# Patient Record
Sex: FEMALE | Race: WHITE | NOT HISPANIC OR LATINO | Employment: UNEMPLOYED | URBAN - METROPOLITAN AREA
[De-identification: names, ages, dates, MRNs, and addresses within clinical notes are randomized per-mention and may not be internally consistent; named-entity substitution may affect disease eponyms.]

---

## 2019-02-15 ENCOUNTER — APPOINTMENT (OUTPATIENT)
Dept: RADIOLOGY | Facility: MEDICAL CENTER | Age: 34
End: 2019-02-15
Attending: STUDENT IN AN ORGANIZED HEALTH CARE EDUCATION/TRAINING PROGRAM
Payer: MEDICAID

## 2019-02-20 ENCOUNTER — HOSPITAL ENCOUNTER (OUTPATIENT)
Dept: RADIOLOGY | Facility: MEDICAL CENTER | Age: 34
End: 2019-02-20
Attending: STUDENT IN AN ORGANIZED HEALTH CARE EDUCATION/TRAINING PROGRAM
Payer: MEDICAID

## 2019-02-20 DIAGNOSIS — M25.552 PAIN OF BOTH HIP JOINTS: ICD-10-CM

## 2019-02-20 DIAGNOSIS — M25.551 PAIN OF BOTH HIP JOINTS: ICD-10-CM

## 2019-02-20 PROCEDURE — A9585 GADOBUTROL INJECTION: HCPCS | Performed by: STUDENT IN AN ORGANIZED HEALTH CARE EDUCATION/TRAINING PROGRAM

## 2019-02-20 PROCEDURE — 73722 MRI JOINT OF LWR EXTR W/DYE: CPT | Mod: LT

## 2019-02-20 PROCEDURE — 27093 INJECTION FOR HIP X-RAY: CPT | Mod: LT

## 2019-02-20 PROCEDURE — 700117 HCHG RX CONTRAST REV CODE 255: Performed by: STUDENT IN AN ORGANIZED HEALTH CARE EDUCATION/TRAINING PROGRAM

## 2019-02-20 PROCEDURE — 700111 HCHG RX REV CODE 636 W/ 250 OVERRIDE (IP): Performed by: STUDENT IN AN ORGANIZED HEALTH CARE EDUCATION/TRAINING PROGRAM

## 2019-02-20 RX ORDER — GADOBUTROL 604.72 MG/ML
2 INJECTION INTRAVENOUS ONCE
Status: COMPLETED | OUTPATIENT
Start: 2019-02-20 | End: 2019-02-20

## 2019-02-20 RX ORDER — METHYLPREDNISOLONE ACETATE 80 MG/ML
80 INJECTION, SUSPENSION INTRA-ARTICULAR; INTRALESIONAL; INTRAMUSCULAR; SOFT TISSUE ONCE
Status: COMPLETED | OUTPATIENT
Start: 2019-02-20 | End: 2019-02-20

## 2019-02-20 RX ADMIN — GADOBUTROL 1 ML: 604.72 INJECTION INTRAVENOUS at 13:00

## 2019-02-20 RX ADMIN — METHYLPREDNISOLONE ACETATE 80 MG: 80 INJECTION, SUSPENSION INTRA-ARTICULAR; INTRALESIONAL; INTRAMUSCULAR; SOFT TISSUE at 13:00

## 2019-02-20 RX ADMIN — IOHEXOL 5 ML: 300 INJECTION, SOLUTION INTRAVENOUS at 13:00

## 2019-02-25 ENCOUNTER — HOSPITAL ENCOUNTER (OUTPATIENT)
Dept: RADIOLOGY | Facility: MEDICAL CENTER | Age: 34
End: 2019-02-25
Attending: STUDENT IN AN ORGANIZED HEALTH CARE EDUCATION/TRAINING PROGRAM
Payer: MEDICAID

## 2019-02-25 DIAGNOSIS — M25.552 PAIN OF BOTH HIP JOINTS: ICD-10-CM

## 2019-02-25 DIAGNOSIS — M25.551 PAIN OF BOTH HIP JOINTS: ICD-10-CM

## 2019-02-25 PROCEDURE — 73722 MRI JOINT OF LWR EXTR W/DYE: CPT | Mod: RT

## 2019-02-25 PROCEDURE — A9585 GADOBUTROL INJECTION: HCPCS | Performed by: STUDENT IN AN ORGANIZED HEALTH CARE EDUCATION/TRAINING PROGRAM

## 2019-02-25 PROCEDURE — 700111 HCHG RX REV CODE 636 W/ 250 OVERRIDE (IP): Performed by: STUDENT IN AN ORGANIZED HEALTH CARE EDUCATION/TRAINING PROGRAM

## 2019-02-25 PROCEDURE — 700117 HCHG RX CONTRAST REV CODE 255: Performed by: STUDENT IN AN ORGANIZED HEALTH CARE EDUCATION/TRAINING PROGRAM

## 2019-02-25 PROCEDURE — 27093 INJECTION FOR HIP X-RAY: CPT | Mod: RT

## 2019-02-25 RX ORDER — GADOBUTROL 604.72 MG/ML
2 INJECTION INTRAVENOUS ONCE
Status: COMPLETED | OUTPATIENT
Start: 2019-02-25 | End: 2019-02-25

## 2019-02-25 RX ORDER — METHYLPREDNISOLONE ACETATE 80 MG/ML
80 INJECTION, SUSPENSION INTRA-ARTICULAR; INTRALESIONAL; INTRAMUSCULAR; SOFT TISSUE ONCE
Status: COMPLETED | OUTPATIENT
Start: 2019-02-25 | End: 2019-02-25

## 2019-02-25 RX ADMIN — METHYLPREDNISOLONE ACETATE 80 MG: 80 INJECTION, SUSPENSION INTRA-ARTICULAR; INTRALESIONAL; INTRAMUSCULAR; SOFT TISSUE at 16:05

## 2019-02-25 RX ADMIN — IOHEXOL 5 ML: 300 INJECTION, SOLUTION INTRAVENOUS at 16:05

## 2019-02-25 RX ADMIN — GADOBUTROL 1 ML: 604.72 INJECTION INTRAVENOUS at 16:05

## 2020-03-03 ENCOUNTER — HOSPITAL ENCOUNTER (EMERGENCY)
Facility: MEDICAL CENTER | Age: 35
End: 2020-03-03
Attending: EMERGENCY MEDICINE
Payer: COMMERCIAL

## 2020-03-03 VITALS
RESPIRATION RATE: 18 BRPM | OXYGEN SATURATION: 97 % | SYSTOLIC BLOOD PRESSURE: 122 MMHG | HEART RATE: 80 BPM | DIASTOLIC BLOOD PRESSURE: 80 MMHG | TEMPERATURE: 98.2 F | WEIGHT: 192.02 LBS

## 2020-03-03 DIAGNOSIS — S39.012A STRAIN OF LUMBAR REGION, INITIAL ENCOUNTER: ICD-10-CM
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
## 2020-03-03 DIAGNOSIS — S09.90XA CLOSED HEAD INJURY, INITIAL ENCOUNTER: ICD-10-CM

## 2020-03-03 DIAGNOSIS — S16.1XXA STRAIN OF NECK MUSCLE, INITIAL ENCOUNTER: ICD-10-CM

## 2020-03-03 PROCEDURE — A9270 NON-COVERED ITEM OR SERVICE: HCPCS | Performed by: EMERGENCY MEDICINE

## 2020-03-03 PROCEDURE — 700102 HCHG RX REV CODE 250 W/ 637 OVERRIDE(OP): Performed by: EMERGENCY MEDICINE

## 2020-03-03 PROCEDURE — 99284 EMERGENCY DEPT VISIT MOD MDM: CPT

## 2020-03-03 RX ORDER — IBUPROFEN 600 MG/1
600 TABLET ORAL EVERY 6 HOURS PRN
Qty: 30 TAB | Refills: 0 | Status: SHIPPED | OUTPATIENT
Start: 2020-03-03 | End: 2020-04-02

## 2020-03-03 RX ORDER — CYCLOBENZAPRINE HCL 10 MG
10 TABLET ORAL 3 TIMES DAILY PRN
Qty: 30 TAB | Refills: 0 | Status: SHIPPED | OUTPATIENT
Start: 2020-03-03 | End: 2020-11-03

## 2020-03-03 RX ORDER — IBUPROFEN 200 MG
400 TABLET ORAL ONCE
Status: COMPLETED | OUTPATIENT
Start: 2020-03-03 | End: 2020-03-03

## 2020-03-03 RX ORDER — CYCLOBENZAPRINE HCL 10 MG
10 TABLET ORAL ONCE
Status: COMPLETED | OUTPATIENT
Start: 2020-03-03 | End: 2020-03-03

## 2020-03-03 RX ORDER — ACETAMINOPHEN 325 MG/1
650 TABLET ORAL ONCE
Status: COMPLETED | OUTPATIENT
Start: 2020-03-03 | End: 2020-03-03

## 2020-03-03 RX ORDER — HYDROCODONE BITARTRATE AND ACETAMINOPHEN 7.5; 325 MG/1; MG/1
1-2 TABLET ORAL EVERY 6 HOURS PRN
Status: SHIPPED | COMMUNITY
End: 2022-10-12

## 2020-03-03 RX ADMIN — IBUPROFEN 400 MG: 200 TABLET, FILM COATED ORAL at 17:12

## 2020-03-03 RX ADMIN — ACETAMINOPHEN 650 MG: 325 TABLET, FILM COATED ORAL at 17:12

## 2020-03-03 RX ADMIN — CYCLOBENZAPRINE 10 MG: 10 TABLET, FILM COATED ORAL at 17:12

## 2020-03-03 ASSESSMENT — ENCOUNTER SYMPTOMS
MYALGIAS: 1
BACK PAIN: 1
FEVER: 0
DOUBLE VISION: 0
BLURRED VISION: 0
PHOTOPHOBIA: 0
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
## 2020-07-23 ENCOUNTER — HOSPITAL ENCOUNTER (OUTPATIENT)
Dept: RADIOLOGY | Facility: MEDICAL CENTER | Age: 35
End: 2020-07-23
Attending: PHYSICIAN ASSISTANT
Payer: MEDICAID

## 2020-07-23 DIAGNOSIS — M25.552 LEFT HIP PAIN: ICD-10-CM

## 2020-07-23 DIAGNOSIS — M24.152 OTHER ARTICULAR CARTILAGE DISORDERS, LEFT HIP: ICD-10-CM

## 2020-07-23 PROCEDURE — 73721 MRI JNT OF LWR EXTRE W/O DYE: CPT | Mod: LT

## 2020-11-03 ENCOUNTER — HOSPITAL ENCOUNTER (EMERGENCY)
Facility: MEDICAL CENTER | Age: 35
End: 2020-11-03
Attending: EMERGENCY MEDICINE
Payer: MEDICAID

## 2020-11-03 ENCOUNTER — APPOINTMENT (OUTPATIENT)
Dept: RADIOLOGY | Facility: MEDICAL CENTER | Age: 35
End: 2020-11-03
Attending: EMERGENCY MEDICINE
Payer: MEDICAID

## 2020-11-03 VITALS
RESPIRATION RATE: 16 BRPM | DIASTOLIC BLOOD PRESSURE: 60 MMHG | OXYGEN SATURATION: 100 % | BODY MASS INDEX: 26.13 KG/M2 | TEMPERATURE: 98.6 F | HEIGHT: 70 IN | HEART RATE: 78 BPM | WEIGHT: 182.54 LBS | SYSTOLIC BLOOD PRESSURE: 120 MMHG

## 2020-11-03 DIAGNOSIS — S73.101A HIP SPRAIN, RIGHT, INITIAL ENCOUNTER: ICD-10-CM

## 2020-11-03 LAB
HCG UR QL: NEGATIVE
HCG UR QL: NEGATIVE

## 2020-11-03 PROCEDURE — A9270 NON-COVERED ITEM OR SERVICE: HCPCS | Performed by: EMERGENCY MEDICINE

## 2020-11-03 PROCEDURE — 700102 HCHG RX REV CODE 250 W/ 637 OVERRIDE(OP): Performed by: EMERGENCY MEDICINE

## 2020-11-03 PROCEDURE — 700111 HCHG RX REV CODE 636 W/ 250 OVERRIDE (IP): Performed by: EMERGENCY MEDICINE

## 2020-11-03 PROCEDURE — 99284 EMERGENCY DEPT VISIT MOD MDM: CPT

## 2020-11-03 PROCEDURE — 73501 X-RAY EXAM HIP UNI 1 VIEW: CPT | Mod: RT

## 2020-11-03 PROCEDURE — 96372 THER/PROPH/DIAG INJ SC/IM: CPT

## 2020-11-03 PROCEDURE — 72100 X-RAY EXAM L-S SPINE 2/3 VWS: CPT

## 2020-11-03 PROCEDURE — 81025 URINE PREGNANCY TEST: CPT | Mod: 91

## 2020-11-03 RX ORDER — HYDROCODONE BITARTRATE AND ACETAMINOPHEN 5; 325 MG/1; MG/1
1 TABLET ORAL ONCE
Status: COMPLETED | OUTPATIENT
Start: 2020-11-03 | End: 2020-11-03

## 2020-11-03 RX ORDER — METHOCARBAMOL 750 MG/1
750 TABLET, FILM COATED ORAL 4 TIMES DAILY
Qty: 12 TAB | Refills: 0 | Status: SHIPPED | OUTPATIENT
Start: 2020-11-03 | End: 2020-11-06

## 2020-11-03 RX ORDER — NAPROXEN 500 MG/1
500 TABLET ORAL 2 TIMES DAILY WITH MEALS
Status: SHIPPED | COMMUNITY
End: 2022-10-12

## 2020-11-03 RX ORDER — METHOCARBAMOL 500 MG/1
500 TABLET, FILM COATED ORAL ONCE
Status: COMPLETED | OUTPATIENT
Start: 2020-11-03 | End: 2020-11-03

## 2020-11-03 RX ORDER — KETOROLAC TROMETHAMINE 30 MG/ML
15 INJECTION, SOLUTION INTRAMUSCULAR; INTRAVENOUS ONCE
Status: COMPLETED | OUTPATIENT
Start: 2020-11-03 | End: 2020-11-03

## 2020-11-03 RX ADMIN — METHOCARBAMOL TABLETS 500 MG: 500 TABLET, COATED ORAL at 13:38

## 2020-11-03 RX ADMIN — HYDROCODONE BITARTRATE AND ACETAMINOPHEN 1 TABLET: 5; 325 TABLET ORAL at 15:24

## 2020-11-03 RX ADMIN — KETOROLAC TROMETHAMINE 15 MG: 30 INJECTION, SOLUTION INTRAMUSCULAR; INTRAVENOUS at 13:52

## 2020-11-03 ASSESSMENT — LIFESTYLE VARIABLES
TOTAL SCORE: 0
EVER HAD A DRINK FIRST THING IN THE MORNING TO STEADY YOUR NERVES TO GET RID OF A HANGOVER: NO
EVER FELT BAD OR GUILTY ABOUT YOUR DRINKING: NO
TOTAL SCORE: 0
CONSUMPTION TOTAL: NEGATIVE
AVERAGE NUMBER OF DAYS PER WEEK YOU HAVE A DRINK CONTAINING ALCOHOL: 0
DOES PATIENT WANT TO STOP DRINKING: NO
TOTAL SCORE: 0
HAVE PEOPLE ANNOYED YOU BY CRITICIZING YOUR DRINKING: NO
HOW MANY TIMES IN THE PAST YEAR HAVE YOU HAD 5 OR MORE DRINKS IN A DAY: 0
HAVE YOU EVER FELT YOU SHOULD CUT DOWN ON YOUR DRINKING: NO
ON A TYPICAL DAY WHEN YOU DRINK ALCOHOL HOW MANY DRINKS DO YOU HAVE: 0
DO YOU DRINK ALCOHOL: NO

## 2020-11-03 NOTE — ED TRIAGE NOTES
Pt comes in complaining hip pain. Pt stating she was seen by a chiropractor yesterday and now having severe R hip pain. Pt stating hx of chronic pain to her hips however this is different. Pt is ambulatory

## 2020-11-03 NOTE — DISCHARGE INSTRUCTIONS
Use ice to help with the pain.    Continue to take over-the-counter Advil or Aleve as needed for pain.  Also continue to take your prescribed Norco for pain.    Return to the ER for any worsening pain, changing pain, for radiating pain down your leg, for numbness/tingling/weakness of extremities, loss of bowel or bladder control, fevers over 100.4, shaking chills, abdominal pain, blood in urine, or for any concerns.    Follow-up with your orthopedist within the next 1 to 2 days.  Please call for appointment.

## 2020-11-03 NOTE — ED PROVIDER NOTES
"ED Provider Note    Scribed for Gerda Guzman M.D. by Aleksandra Amado. 11/3/2020  12:50 PM    Primary care provider: Pcp Pt States None  Means of arrival: Walk-in  History obtained from: Patient  History limited by: None noted    CHIEF COMPLAINT  Right hip pain    LIZBETH Mallory is a 34 y.o. female with a history of bilateral hip impingement syndrome who presents with constant moderate right posterior hip pain onset yesterday. Patient states she went to the chiropractor yesterday for left hip pain, and was seen by a different chiropactor then she normally does. She reports the chiropractor adjusted her right hip, which had no pain at the time. However, when adjusting her right hip, she states the practitioner, \"pretzeled\" her right leg over her left, and that during this maneuver she heard a pop in her right posterior hip followed by immediate pain. She adds that her pain is much worse than her chronic hip pain although it is in the same location as her previous right hip pain. Per patient, her pain is exacerbated with any movement at all. Patient denies any pain radiation down her legs. She describes her pain as \"pinching and very localized.\" She denies any, weakness,  numbness or tingling in her legs. The patient is able to ambulate. She reports taking Norco, Ibuprofen, and icing and heating her hip with no alleviation of her symptoms. She notes her last dose of 2 Aleve was at 11:00 AM. She adds that her Norco will usually alleviate her chronic hip pain. Patient has no history of hip surgery. She often takes antiinflammatories to help with her symptoms. She is followed by Dr. Perales for her bilateral hip impingement. No fevers or chills. No abdominal pain. No hematuria.     REVIEW OF SYSTEMS  Positives include right hip pain. Negatives include no fever, chills, hematuria, abdominal pain,weakness, numbness or tingling down bilateral legs.      PAST MEDICAL HISTORY   has a past medical history of Herpes and " "Pain.    SURGICAL HISTORY  patient denies any surgical history    SOCIAL HISTORY  Social History     Tobacco Use   • Smoking status: Current Every Day Smoker     Packs/day: 0.25     Types: Cigarettes   • Smokeless tobacco: Never Used   Substance Use Topics   • Alcohol use: Yes   • Drug use: Yes     Types: Inhaled     Comment: marijuana      Social History     Substance and Sexual Activity   Drug Use Yes   • Types: Inhaled    Comment: marijuana       FAMILY HISTORY  History reviewed. No pertinent family history.    CURRENT MEDICATIONS  Home Medications     Reviewed by Grecia Kim R.N. (Registered Nurse) on 11/03/20 at 1157  Med List Status: Complete   Medication Last Dose Status   HYDROcodone-acetaminophen (NORCO) 7.5-325 MG per tablet  Active   MICROGESTIN FE 1/20 PO  Active   naproxen (NAPROSYN) 500 MG Tab  Active                ALLERGIES  Allergies   Allergen Reactions   • Codeine    • Codeine        PHYSICAL EXAM  VITAL SIGNS: /85   Pulse 94   Temp 36.7 °C (98.1 °F) (Temporal)   Resp 12   Ht 1.778 m (5' 10\")   Wt 82.8 kg (182 lb 8.7 oz)   SpO2 98%   BMI 26.19 kg/m²   Constitutional:  Alert in no apparent distress.  HENT: Normocephalic, Bilateral external ears normal. Nose normal. Oropharyngeal exam deferred to COVID-19 outbreak and lack of oropharyngeal complaints.  Eyes: Conjunctiva normal, non-icteric.   Thorax & Lungs: Easy unlabored respirations  Skin: Visualized skin warm and dry, No erythema, No rash.   Extremities:   Full range of motion, No asymmetry  Neurologic: Alert, clear speech, lower extremity strengths are 5-5 and equal bilaterally with testing of dorsiflexors, plantar flexors, quadriceps and hamstrings.  Patient able to elevate bilateral legs up off the bed not any difficulty.  Negative straight leg your legs bilaterally.  Patient has been observed ambulating to the bathroom without any significant difficulty.  Musculoskeletal: There is some tenderness with deep palpation of the " right lower lumbar region in the area of the upper sacrum..  No SI notch tenderness or buttock tenderness.  There is some reproducible pain in the upper right buttock area with range of motion of the right hip.  No shortening or rotation.  2+ DP and PT pulses equal bilaterally.  Psychiatric: Affect and Mood normal    LABS  Results for orders placed or performed during the hospital encounter of 11/03/20   BETA-HCG QUALITATIVE URINE   Result Value Ref Range    Beta-Hcg Urine Negative Negative   POC URINE PREGNANCY   Result Value Ref Range    POC Urine Pregnancy Test Negative Negative     All labs reviewed by me.    DX-LUMBAR SPINE-2 OR 3 VIEWS   Final Result      L5-S1 mild disc degeneration.      DX-HIP-UNILATERAL-WITH PELVIS-1 VIEW RIGHT   Final Result      Mild degenerative changes without acute osseous abnormality.          COURSE & MEDICAL DECISION MAKING  Nursing notes and vital signs were reviewed. (See chart for details)    12:50 PM - Patient seen and examined at bedside. Patient is laying down in bed with face mask on. She currently has right hip pain and is requesting Toradol. Patient will be treated with Robaxin 500 mg and Toradol 15 mg. Ordered labs and imaging to evaluate her symptoms.     3:07 PM - Patient will be treated with Norco 5-325 mg.    4:00 PM - Patient was reevaluated at bedside. She reports feeling improved following medications. Discussed lab and radiology results with the patient as detailed above. Patient is informed that they are now stable for discharge. She was urged to follow up with Gallo Velasquez regarding today's visit. Patient is advised of all return precautions. Patient verbalizes an understanding and agreement to this plan of care.     PPE Note: I personally donned full PPE for all patient encounters during this visit, including an N95 respirator mask, gloves, gown, and goggles.     Scribe remained outside the patient's room and did not have any contact with the patient  for the duration of patient encounter.     Decision Making:  The patient presents to the Emergency Department with to the ER with complaint of sudden onset of right hip pain after the chiropractor she was seen yesterday for her left hip problem, crossed her right leg over her left leg in a rotated and abducted manner, causing a pop in the back part of her right hip/sacral area.  She states since then she has had quite a lot of discomfort.  Patient has history of bilateral hip impingement syndrome.  She says she has had pain in this part of her right hip before, but this pain is much more severe and feels a little bit different.  However the location is similar.  No numbness or tingling into the leg or foot.  No weakness of the leg.  No loss of bowel or bladder control.  Patient is able to ambulate.  She says she was completely fine until the chiropractor took her leg and rotated and abducted over her left leg.  Patient takes Norco daily for her chronic hip pain.  She took some Aleve prior to arrival.  I gave her a small dose of IM Toradol here as she thought that might help.  I also gave her some Robaxin.  Robaxin and Toradol did not help so then I gave her Norco.  At this time, I think a pop the patient might of felt without particular movement of her leg, may be in a ligamentous tear.  Possible she could have a muscle tear as well.  X-rays are negative.  No obvious bony abnormality.  She is able to ambulate.  Not think we need to do CT scan or MRI scan of her hip at this time.  She has known hip problems and follows with Dr. Perales, orthopedics.  She has been instructed to follow-up with him within the next several days.  She is to call for appointment.  She has been given strict return precautions and discharge instructions for musculoskeletal strain/sprain and possible ligamentous tear, and understands treatment plan and follow-up.    Discharge Medications: Robaxin        DISPOSITION:  Patient will be  discharged home in stable condition.    FOLLOW UP:  Kalyan Perales M.D.  [address] [place] [address]  [phone]    Schedule an appointment as soon as possible for a visit   If symptoms worsen return to ER      OUTPATIENT MEDICATIONS:  Discharge Medication List as of 11/3/2020  3:58 PM      START taking these medications    Details   methocarbamol (ROBAXIN) 750 MG Tab Take 1 Tab by mouth 4 times a day for 3 days., Disp-12 Tab, R-0, Print Rx Paper                FINAL IMPRESSION  1. Hip sprain, right, initial encounter           This dictation has been created using voice recognition software. The accuracy of the dictation is limited by the abilities of the software. I expect there may be some errors of grammar and possibly content. I made every attempt to manually correct the errors within my dictation. However, errors related to voice recognition software may still exist and should be interpreted within the appropriate context.     Aleksandra ZAFAR (Scribe), am scribing for, and in the presence of, Gerda Guzman M.D..    Electronically signed by: Aleksandra Amado (Scribe), 11/3/2020    Gerda ZAFAR M.D. personally performed the services described in this documentation, as scribed by Aleksandra Amado in my presence, and it is both accurate and complete. E.    The note accurately reflects work and decisions made by me.  Gerda Guzman M.D.  11/3/2020  3:43 PM

## 2021-02-18 ENCOUNTER — HOSPITAL ENCOUNTER (OUTPATIENT)
Facility: MEDICAL CENTER | Age: 36
End: 2021-02-18
Attending: OBSTETRICS & GYNECOLOGY | Admitting: OBSTETRICS & GYNECOLOGY
Payer: MEDICAID

## 2021-06-16 ENCOUNTER — HOSPITAL ENCOUNTER (EMERGENCY)
Facility: MEDICAL CENTER | Age: 36
End: 2021-06-16
Attending: EMERGENCY MEDICINE
Payer: MEDICAID

## 2021-06-16 ENCOUNTER — APPOINTMENT (OUTPATIENT)
Dept: RADIOLOGY | Facility: MEDICAL CENTER | Age: 36
End: 2021-06-16
Attending: EMERGENCY MEDICINE
Payer: MEDICAID

## 2021-06-16 VITALS
OXYGEN SATURATION: 97 % | BODY MASS INDEX: 25.98 KG/M2 | SYSTOLIC BLOOD PRESSURE: 104 MMHG | HEIGHT: 70 IN | WEIGHT: 181.44 LBS | DIASTOLIC BLOOD PRESSURE: 60 MMHG | RESPIRATION RATE: 18 BRPM | HEART RATE: 72 BPM | TEMPERATURE: 96.8 F

## 2021-06-16 DIAGNOSIS — S50.02XA CONTUSION OF LEFT ELBOW, INITIAL ENCOUNTER: ICD-10-CM
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
## 2021-06-16 PROCEDURE — 99284 EMERGENCY DEPT VISIT MOD MDM: CPT

## 2021-06-16 PROCEDURE — A9270 NON-COVERED ITEM OR SERVICE: HCPCS | Performed by: EMERGENCY MEDICINE

## 2021-06-16 PROCEDURE — 700102 HCHG RX REV CODE 250 W/ 637 OVERRIDE(OP): Performed by: EMERGENCY MEDICINE

## 2021-06-16 PROCEDURE — 73080 X-RAY EXAM OF ELBOW: CPT | Mod: RT

## 2021-06-16 RX ORDER — OXYCODONE HYDROCHLORIDE 5 MG/1
10 TABLET ORAL ONCE
Status: COMPLETED | OUTPATIENT
Start: 2021-06-16 | End: 2021-06-16

## 2021-06-16 RX ADMIN — OXYCODONE 10 MG: 5 TABLET ORAL at 10:38

## 2021-06-16 NOTE — ED TRIAGE NOTES
"Chief Complaint   Patient presents with   • T-5000 FALL     while hanging something she fell off of a ladder, onto the ladder. approx 1 hour ago. pain to bilateral arms near elbows, RT hurts more than LT.    • Arm Injury     Pt to triage for above. -LOC. Did not hit head. NAD noted. Bruising and swelling observed to bilateral arms.     /70   Pulse 98   Temp 36 °C (96.8 °F) (Temporal)   Resp 16   Ht 1.778 m (5' 10\")   Wt 82.3 kg (181 lb 7 oz)   SpO2 97%   BMI 26.03 kg/m²     "

## 2021-06-16 NOTE — ED PROVIDER NOTES
"ED Provider Note    CHIEF COMPLAINT  Chief Complaint   Patient presents with   • T-5000 FALL     while hanging something she fell off of a ladder, onto the ladder. approx 1 hour ago. pain to bilateral arms near elbows, RT hurts more than LT.    • Arm Injury     Seen at 10:30 AM    HPI  Magui Mallory is a 35 y.o. female who presents with moderate constant, throbbing right elbow pain, worse with movement.  The patient was standing on a stepladder when the ladder fell over, she fell backwards, striking the bilateral elbows on the frame of the ladder before striking the ground.  She did not strike her head.  She denies any headache, loss of conscious, neck pain, back pain, numbness or focal weakness.  She has been fully ambulatory since the fall.  She notes bilateral elbow pain, the left is mild and she has good range of motion.  The right is much more significant and she is having difficulty moving the forearm and hands as it reproduces pain in the elbow.  She does take chronic hydrocodone for left hip pain.  She took a dose prior to arrival.  She is not on any anticoagulation.    REVIEW OF SYSTEMS  See HPI  PAST MEDICAL HISTORY   has a past medical history of Herpes and Pain.    SOCIAL HISTORY  Social History     Tobacco Use   • Smoking status: Current Every Day Smoker     Packs/day: 0.25     Types: Cigarettes   • Smokeless tobacco: Never Used   Vaping Use   • Vaping Use: Never used   Substance and Sexual Activity   • Alcohol use: Yes   • Drug use: Yes     Types: Inhaled     Comment: marijuana   • Sexual activity: Not on file       SURGICAL HISTORY  patient denies any surgical history    CURRENT MEDICATIONS  Reviewed.  See Encounter Summary.     ALLERGIES  Allergies   Allergen Reactions   • Codeine    • Codeine        PHYSICAL EXAM  VITAL SIGNS: /60   Pulse 72   Temp 36 °C (96.8 °F) (Temporal)   Resp 18   Ht 1.778 m (5' 10\")   Wt 82.3 kg (181 lb 7 oz)   SpO2 97%   BMI 26.03 kg/m²   Constitutional: " Awake, alert in no apparent distress.  HENT: Normocephalic, atraumatic.  No midline cervical tenderness, full range of motion.  Bilateral external ears normal. Nose normal.    Eyes: Conjunctiva normal, non-icteric, EOMI.    Thorax & Lungs: Easy unlabored respirations  Cardiovascular: Brisk capillary refill, radial pulse 2+.  Abdomen:  No distention  Skin: Visualized skin is  Dry, No erythema, No rash.   Extremities: Clavicles, forearms, hands atraumatic.  The patient has abrasions and some mild ecchymosis over the posterior left elbow but the elbow itself has excellent range of motion.  The right elbow has limited range of motion, mild posterior effusion.  No crepitus.  No midline tenderness throughout the back or step-off.  Neurologic: Alert, Grossly non-focal.   Psychiatric: Affect and Mood normal    RADIOLOGY  DX-ELBOW-COMPLETE 3+ RIGHT   Final Result         No acute osseous abnormality.          Nursing notes and vital signs were reviewed. (See chart for details)    Decision Making:  This is a pleasant 35 y.o. year old female who presents with significant right elbow pain after direct trauma.  The head did not have any trauma, the neck can be cleared using Nexus criteria.  X-rays of the elbow are negative for fracture.  Tetanus is up-to-date.  The patient will be discharged.  I recommend rice therapy in addition to her regular pain medications.    DISPOSITION:  Patient will be discharged home in good condition.    Discharge Medications:  Discharge Medication List as of 6/16/2021 11:25 AM          The patient was discharged home (see d/c instructions) and told to return immediately for any signs or symptoms listed, or any worsening at all.  The patient verbally agreed to the discharge precautions and follow-up plan which is documented in EPIC.        FINAL IMPRESSION  1. Contusion of left elbow, initial encounter    2. Fall, initial encounter

## 2021-06-16 NOTE — ED NOTES
Sling placed right arm. Pt states understanding of how to position and place sling. States understanding of discharge instructions.

## 2021-06-16 NOTE — ED NOTES
Pt states she was standing on 5 foot step ladder when she fell backwards and struck bottom and both elbows on ground. Denies neck or back pain. Denies loss of consc. A/Ox4. No obvious deformity noted. No active bleeding or open wounds noted. Left arm with area of abrasion/ bruising. States normal feeling to fingers/ hands.  Fingers/ hands skin pink, warm, dry.

## 2021-07-22 PROBLEM — S46.001A ROTATOR CUFF INJURY, RIGHT, INITIAL ENCOUNTER: Status: ACTIVE | Noted: 2021-07-22

## 2021-08-30 ENCOUNTER — TELEPHONE (OUTPATIENT)
Dept: SURGERY | Facility: MEDICAL CENTER | Age: 36
End: 2021-08-30

## 2021-09-22 ENCOUNTER — HOSPITAL ENCOUNTER (OUTPATIENT)
Facility: MEDICAL CENTER | Age: 36
End: 2021-09-22
Attending: OBSTETRICS & GYNECOLOGY | Admitting: OBSTETRICS & GYNECOLOGY
Payer: MEDICAID

## 2021-09-30 ENCOUNTER — PRE-ADMISSION TESTING (OUTPATIENT)
Dept: ADMISSIONS | Facility: MEDICAL CENTER | Age: 36
End: 2021-09-30
Attending: OBSTETRICS & GYNECOLOGY
Payer: MEDICAID

## 2021-09-30 VITALS — WEIGHT: 177.47 LBS | BODY MASS INDEX: 25.41 KG/M2 | HEIGHT: 70 IN

## 2021-09-30 DIAGNOSIS — Z01.812 PRE-OPERATIVE LABORATORY EXAMINATION: ICD-10-CM

## 2021-09-30 LAB
ANION GAP SERPL CALC-SCNC: 10 MMOL/L (ref 7–16)
APPEARANCE UR: CLEAR
BACTERIA #/AREA URNS HPF: NEGATIVE /HPF
BASOPHILS # BLD AUTO: 0.3 % (ref 0–1.8)
BASOPHILS # BLD: 0.02 K/UL (ref 0–0.12)
BILIRUB UR QL STRIP.AUTO: NEGATIVE
BUN SERPL-MCNC: 13 MG/DL (ref 8–22)
CALCIUM SERPL-MCNC: 9.2 MG/DL (ref 8.5–10.5)
CHLORIDE SERPL-SCNC: 103 MMOL/L (ref 96–112)
CO2 SERPL-SCNC: 26 MMOL/L (ref 20–33)
COLOR UR: YELLOW
CREAT SERPL-MCNC: 0.68 MG/DL (ref 0.5–1.4)
EOSINOPHIL # BLD AUTO: 0.08 K/UL (ref 0–0.51)
EOSINOPHIL NFR BLD: 1.3 % (ref 0–6.9)
EPI CELLS #/AREA URNS HPF: NEGATIVE /HPF
ERYTHROCYTE [DISTWIDTH] IN BLOOD BY AUTOMATED COUNT: 45.5 FL (ref 35.9–50)
GLUCOSE SERPL-MCNC: 90 MG/DL (ref 65–99)
GLUCOSE UR STRIP.AUTO-MCNC: NEGATIVE MG/DL
HCG SERPL QL: NEGATIVE
HCT VFR BLD AUTO: 41.1 % (ref 37–47)
HGB BLD-MCNC: 13.7 G/DL (ref 12–16)
HYALINE CASTS #/AREA URNS LPF: ABNORMAL /LPF
IMM GRANULOCYTES # BLD AUTO: 0.02 K/UL (ref 0–0.11)
IMM GRANULOCYTES NFR BLD AUTO: 0.3 % (ref 0–0.9)
KETONES UR STRIP.AUTO-MCNC: NEGATIVE MG/DL
LEUKOCYTE ESTERASE UR QL STRIP.AUTO: NEGATIVE
LYMPHOCYTES # BLD AUTO: 1.54 K/UL (ref 1–4.8)
LYMPHOCYTES NFR BLD: 25.6 % (ref 22–41)
MCH RBC QN AUTO: 31.6 PG (ref 27–33)
MCHC RBC AUTO-ENTMCNC: 33.3 G/DL (ref 33.6–35)
MCV RBC AUTO: 94.7 FL (ref 81.4–97.8)
MICRO URNS: ABNORMAL
MONOCYTES # BLD AUTO: 0.42 K/UL (ref 0–0.85)
MONOCYTES NFR BLD AUTO: 7 % (ref 0–13.4)
NEUTROPHILS # BLD AUTO: 3.93 K/UL (ref 2–7.15)
NEUTROPHILS NFR BLD: 65.5 % (ref 44–72)
NITRITE UR QL STRIP.AUTO: NEGATIVE
NRBC # BLD AUTO: 0 K/UL
NRBC BLD-RTO: 0 /100 WBC
PH UR STRIP.AUTO: 7 [PH] (ref 5–8)
PLATELET # BLD AUTO: 201 K/UL (ref 164–446)
PMV BLD AUTO: 10.6 FL (ref 9–12.9)
POTASSIUM SERPL-SCNC: 4.3 MMOL/L (ref 3.6–5.5)
PROT UR QL STRIP: NEGATIVE MG/DL
RBC # BLD AUTO: 4.34 M/UL (ref 4.2–5.4)
RBC # URNS HPF: ABNORMAL /HPF
RBC UR QL AUTO: ABNORMAL
SODIUM SERPL-SCNC: 139 MMOL/L (ref 135–145)
SP GR UR STRIP.AUTO: 1.01
UROBILINOGEN UR STRIP.AUTO-MCNC: 0.2 MG/DL
WBC # BLD AUTO: 6 K/UL (ref 4.8–10.8)
WBC #/AREA URNS HPF: ABNORMAL /HPF

## 2021-09-30 PROCEDURE — 85025 COMPLETE CBC W/AUTO DIFF WBC: CPT

## 2021-09-30 PROCEDURE — 36415 COLL VENOUS BLD VENIPUNCTURE: CPT

## 2021-09-30 PROCEDURE — 84703 CHORIONIC GONADOTROPIN ASSAY: CPT

## 2021-09-30 PROCEDURE — 80048 BASIC METABOLIC PNL TOTAL CA: CPT

## 2021-09-30 PROCEDURE — 81001 URINALYSIS AUTO W/SCOPE: CPT

## 2021-09-30 RX ORDER — ESOMEPRAZOLE MAGNESIUM 10 MG/1
GRANULE, FOR SUSPENSION, EXTENDED RELEASE ORAL
COMMUNITY
End: 2023-11-28

## 2021-10-07 ENCOUNTER — HOSPITAL ENCOUNTER (EMERGENCY)
Facility: MEDICAL CENTER | Age: 36
End: 2021-10-07
Attending: EMERGENCY MEDICINE
Payer: MEDICAID

## 2021-10-07 VITALS
DIASTOLIC BLOOD PRESSURE: 72 MMHG | OXYGEN SATURATION: 98 % | BODY MASS INDEX: 31.05 KG/M2 | HEIGHT: 63 IN | TEMPERATURE: 97.6 F | SYSTOLIC BLOOD PRESSURE: 129 MMHG | RESPIRATION RATE: 18 BRPM | HEART RATE: 62 BPM | WEIGHT: 175.27 LBS

## 2021-10-07 DIAGNOSIS — R11.2 CANNABINOID HYPEREMESIS SYNDROME: ICD-10-CM

## 2021-10-07 DIAGNOSIS — F12.90 CANNABINOID HYPEREMESIS SYNDROME: ICD-10-CM

## 2021-10-07 LAB
ALBUMIN SERPL BCP-MCNC: 4.6 G/DL (ref 3.2–4.9)
ALBUMIN/GLOB SERPL: 2 G/DL
ALP SERPL-CCNC: 58 U/L (ref 30–99)
ALT SERPL-CCNC: 12 U/L (ref 2–50)
ANION GAP SERPL CALC-SCNC: 12 MMOL/L (ref 7–16)
AST SERPL-CCNC: 20 U/L (ref 12–45)
BASOPHILS # BLD AUTO: 0.2 % (ref 0–1.8)
BASOPHILS # BLD: 0.02 K/UL (ref 0–0.12)
BILIRUB SERPL-MCNC: 0.5 MG/DL (ref 0.1–1.5)
BUN SERPL-MCNC: 12 MG/DL (ref 8–22)
CALCIUM SERPL-MCNC: 9.3 MG/DL (ref 8.5–10.5)
CHLORIDE SERPL-SCNC: 106 MMOL/L (ref 96–112)
CO2 SERPL-SCNC: 20 MMOL/L (ref 20–33)
CREAT SERPL-MCNC: 0.71 MG/DL (ref 0.5–1.4)
EOSINOPHIL # BLD AUTO: 0 K/UL (ref 0–0.51)
EOSINOPHIL NFR BLD: 0 % (ref 0–6.9)
ERYTHROCYTE [DISTWIDTH] IN BLOOD BY AUTOMATED COUNT: 42.3 FL (ref 35.9–50)
GLOBULIN SER CALC-MCNC: 2.3 G/DL (ref 1.9–3.5)
GLUCOSE SERPL-MCNC: 171 MG/DL (ref 65–99)
HCT VFR BLD AUTO: 41.3 % (ref 37–47)
HGB BLD-MCNC: 14.6 G/DL (ref 12–16)
IMM GRANULOCYTES # BLD AUTO: 0.04 K/UL (ref 0–0.11)
IMM GRANULOCYTES NFR BLD AUTO: 0.5 % (ref 0–0.9)
LIPASE SERPL-CCNC: 15 U/L (ref 11–82)
LYMPHOCYTES # BLD AUTO: 1.04 K/UL (ref 1–4.8)
LYMPHOCYTES NFR BLD: 12.4 % (ref 22–41)
MCH RBC QN AUTO: 31.9 PG (ref 27–33)
MCHC RBC AUTO-ENTMCNC: 35.4 G/DL (ref 33.6–35)
MCV RBC AUTO: 90.4 FL (ref 81.4–97.8)
MONOCYTES # BLD AUTO: 0.27 K/UL (ref 0–0.85)
MONOCYTES NFR BLD AUTO: 3.2 % (ref 0–13.4)
NEUTROPHILS # BLD AUTO: 7.01 K/UL (ref 2–7.15)
NEUTROPHILS NFR BLD: 83.7 % (ref 44–72)
NRBC # BLD AUTO: 0 K/UL
NRBC BLD-RTO: 0 /100 WBC
PLATELET # BLD AUTO: 222 K/UL (ref 164–446)
PMV BLD AUTO: 10 FL (ref 9–12.9)
POTASSIUM SERPL-SCNC: 3.7 MMOL/L (ref 3.6–5.5)
PROT SERPL-MCNC: 6.9 G/DL (ref 6–8.2)
RBC # BLD AUTO: 4.57 M/UL (ref 4.2–5.4)
SODIUM SERPL-SCNC: 138 MMOL/L (ref 135–145)
WBC # BLD AUTO: 8.4 K/UL (ref 4.8–10.8)

## 2021-10-07 PROCEDURE — 99284 EMERGENCY DEPT VISIT MOD MDM: CPT

## 2021-10-07 PROCEDURE — 96375 TX/PRO/DX INJ NEW DRUG ADDON: CPT

## 2021-10-07 PROCEDURE — 700105 HCHG RX REV CODE 258: Performed by: EMERGENCY MEDICINE

## 2021-10-07 PROCEDURE — 80053 COMPREHEN METABOLIC PANEL: CPT

## 2021-10-07 PROCEDURE — 700111 HCHG RX REV CODE 636 W/ 250 OVERRIDE (IP): Performed by: EMERGENCY MEDICINE

## 2021-10-07 PROCEDURE — 96374 THER/PROPH/DIAG INJ IV PUSH: CPT

## 2021-10-07 PROCEDURE — 85025 COMPLETE CBC W/AUTO DIFF WBC: CPT

## 2021-10-07 PROCEDURE — 83690 ASSAY OF LIPASE: CPT

## 2021-10-07 RX ORDER — METOCLOPRAMIDE HYDROCHLORIDE 5 MG/ML
10 INJECTION INTRAMUSCULAR; INTRAVENOUS ONCE
Status: COMPLETED | OUTPATIENT
Start: 2021-10-07 | End: 2021-10-07

## 2021-10-07 RX ORDER — PROMETHAZINE HYDROCHLORIDE 25 MG/1
25 SUPPOSITORY RECTAL EVERY 6 HOURS PRN
Qty: 12 SUPPOSITORY | Refills: 0 | Status: ON HOLD | OUTPATIENT
Start: 2021-10-07 | End: 2023-03-03

## 2021-10-07 RX ORDER — HALOPERIDOL 5 MG/ML
2 INJECTION INTRAMUSCULAR ONCE
Status: COMPLETED | OUTPATIENT
Start: 2021-10-07 | End: 2021-10-07

## 2021-10-07 RX ORDER — LORAZEPAM 2 MG/ML
1 INJECTION INTRAMUSCULAR ONCE
Status: COMPLETED | OUTPATIENT
Start: 2021-10-07 | End: 2021-10-07

## 2021-10-07 RX ORDER — SODIUM CHLORIDE 9 MG/ML
1000 INJECTION, SOLUTION INTRAVENOUS ONCE
Status: COMPLETED | OUTPATIENT
Start: 2021-10-07 | End: 2021-10-07

## 2021-10-07 RX ORDER — DIPHENHYDRAMINE HYDROCHLORIDE 50 MG/ML
25 INJECTION INTRAMUSCULAR; INTRAVENOUS ONCE
Status: COMPLETED | OUTPATIENT
Start: 2021-10-07 | End: 2021-10-07

## 2021-10-07 RX ORDER — MORPHINE SULFATE 4 MG/ML
4 INJECTION, SOLUTION INTRAMUSCULAR; INTRAVENOUS ONCE
Status: COMPLETED | OUTPATIENT
Start: 2021-10-07 | End: 2021-10-07

## 2021-10-07 RX ADMIN — LORAZEPAM 1 MG: 2 INJECTION INTRAMUSCULAR; INTRAVENOUS at 07:55

## 2021-10-07 RX ADMIN — HALOPERIDOL LACTATE 2 MG: 5 INJECTION, SOLUTION INTRAMUSCULAR at 07:54

## 2021-10-07 RX ADMIN — METOCLOPRAMIDE 10 MG: 5 INJECTION, SOLUTION INTRAMUSCULAR; INTRAVENOUS at 07:54

## 2021-10-07 RX ADMIN — MORPHINE SULFATE 4 MG: 4 INJECTION INTRAVENOUS at 09:08

## 2021-10-07 RX ADMIN — SODIUM CHLORIDE 1000 ML: 9 INJECTION, SOLUTION INTRAVENOUS at 07:59

## 2021-10-07 RX ADMIN — DIPHENHYDRAMINE HYDROCHLORIDE 25 MG: 50 INJECTION INTRAMUSCULAR; INTRAVENOUS at 07:55

## 2021-10-07 ASSESSMENT — PAIN DESCRIPTION - DESCRIPTORS: DESCRIPTORS: BURNING

## 2021-10-07 NOTE — ED TRIAGE NOTES
Chief Complaint   Patient presents with   • N/V   • Abdominal Pain     Patient ambulatory to triage. Woke-up with severe abdominal pain and N/V. Patient's mother states that she is in the ED every few months for the same, with no diagnosis.

## 2021-10-07 NOTE — ED NOTES
Patient is resting on cart, appears significantly more comfortable. Pt remains on monitoring, updated on pending POC, call light within reach. Siderailsx2, blankets in place, lights dimmed for comfort.

## 2021-10-07 NOTE — ED PROVIDER NOTES
ED Provider Note    Scribed for Pedro Castro M.D. by Cesar Bello. 10/7/2021  7:34 AM    Primary care provider: No primary care provider noted.  Means of arrival: Walk-in  History obtained from: Patient  History limited by: None    CHIEF COMPLAINT  Chief Complaint   Patient presents with   • N/V   • Abdominal Pain       HPI  Magui Mallory is a 35 y.o. female who presents to the Emergency Department for acute, worsening abdominal pain. Patient states she was feeling normally yesterday until her symptoms started at around midnight. She reports being woken up with significant abdominal pain with nausea and vomiting. Patient has a longtime, recurrent history of abdominal pain, states she was diagnosed with abdominal migraine by her GI. She took her compazine, Naproxen, and suppositories at home with no relief. Patient does have a history of cannabis usage; last used yesterday. She denies any hematemesis or fevers. Her mother at bedside states the patient is admitted around every 3 months with similar symptoms. Patient's last endoscopy was 3 years ago and was unremarkable. She currently takes Norco daily for pelvic pain.    REVIEW OF SYSTEMS  Pertinent negatives include no hematemesis or fevers. As above, all other systems reviewed and are negative.   See HPI for further details.     PAST MEDICAL HISTORY   has a past medical history of Arthritis, Herpes, Pain, and Psychiatric problem.    SURGICAL HISTORY   has a past surgical history that includes meniscus repair (2015).    SOCIAL HISTORY  Social History     Tobacco Use   • Smoking status: Current Every Day Smoker     Packs/day: 0.25     Years: 20.00     Pack years: 5.00     Types: Cigarettes   • Smokeless tobacco: Never Used   Vaping Use   • Vaping Use: Never used   Substance Use Topics   • Alcohol use: Yes     Comment: 2/month   • Drug use: Yes     Types: Inhaled     Comment: marijuana      Social History     Substance and Sexual Activity   Drug Use Yes   •  "Types: Inhaled    Comment: marijuana       FAMILY HISTORY  History reviewed. No pertinent family history.    CURRENT MEDICATIONS  Home Medications     Reviewed by Raven Guthrie R.N. (Registered Nurse) on 10/07/21 at 0654  Med List Status: Partial   Medication Last Dose Status   ALPRAZolam (XANAX) 0.5 MG Tab  Active   Esomeprazole Magnesium (NEXIUM) 10 MG Pack  Active   HYDROcodone-acetaminophen (NORCO) 7.5-325 MG per tablet  Active   MICROGESTIN FE 1/20 PO  Active   naproxen (NAPROSYN) 500 MG Tab  Active                ALLERGIES  Allergies   Allergen Reactions   • Codeine    • Codeine        PHYSICAL EXAM  VITAL SIGNS: /77   Pulse 70   Temp 36.4 °C (97.5 °F) (Oral)   Resp 18   Ht 1.6 m (5' 3\")   Wt 79.5 kg (175 lb 4.3 oz)   LMP 09/30/2021   SpO2 99%   BMI 31.05 kg/m²   Constitutional: Moderate distress, appears ill and mildly diaphorectic. Well developed, Well nourished, Non-toxic appearance.   HENT: Normocephalic, Atraumatic, Bilateral external ears normal, Oropharynx is clear mucous membranes are moist. No oral exudates or nasal discharge.   Eyes: Pupils are equal round and reactive, EOMI, Conjunctiva normal, No discharge.   Neck: Normal range of motion, No tenderness, Supple, No stridor. No meningismus.  Lymphatic: No lymphadenopathy noted.   Cardiovascular: Regular rate and rhythm without murmur rub or gallop.  Thorax & Lungs: Clear breath sounds bilaterally without wheezes, rhonchi or rales. There is no chest wall tenderness.   Abdomen: Epigastric tenderness to palpation. Soft non-distended. There is no rebound or guarding. No organomegaly is appreciated. Bowel sounds are normal.  Skin: Mildly diaphoretic. Normal without rash.   Back: No CVA or spinal tenderness.   Extremities: Intact distal pulses, No edema, No tenderness, No cyanosis, No clubbing. Capillary refill is less than 2 seconds.  Musculoskeletal: Good range of motion in all major joints. No tenderness to palpation or major deformities " noted.   Neurologic: Alert & oriented x 3, Normal motor function, Normal sensory function, No focal deficits noted. Reflexes are normal.  Psychiatric: Affect normal, Judgment normal, Mood normal. There is no suicidal ideation or patient reported hallucinations.       DIAGNOSTIC STUDIES / PROCEDURES    LABS  Labs Reviewed   CBC WITH DIFFERENTIAL - Abnormal; Notable for the following components:       Result Value    MCHC 35.4 (*)     Neutrophils-Polys 83.70 (*)     Lymphocytes 12.40 (*)     All other components within normal limits   COMP METABOLIC PANEL - Abnormal; Notable for the following components:    Glucose 171 (*)     All other components within normal limits   LIPASE   ESTIMATED GFR      All labs reviewed by me.    COURSE & MEDICAL DECISION MAKING  Nursing notes, VS, PMSFHx reviewed in chart.    7:34 AM Patient seen and examined at bedside.  Patient was treated with Haldol 2 mg, Reglan 10 mg, Benadryl 25 mg, and Ativan 1 mg for her symptoms. Ordered for eGFR, CBC w/ diff, CMP, and Lipase    8:53 AM Updated patient on results which were reassuring. She feels slightly improved, but continues to experience abdominal pain. Patient notes that she is currently on Norco for pelvic pain. Plan to treat with morphine 4 mg.     Laboratory evaluation reveals no leukocytosis, shift of 84% PMNs, no electrolyte derangements or acidosis with mild hyperglycemia at 171.  No evidence of pancreatitis or biliary tree obstruction    9:48 AM Patient reevaluated at bedside; she is resting comfortably with stable vitals and has no new complaints at this time. Encourage her to continue following up in out patient setting, and recommended that she discontinue marijuana products. Discussed discharge instructions and return precautions with the patient and they were cleared for discharge. Patient was given the opportunity to ask any further questions. Patient is comfortable with discharge at this time.        HYDRATION: Based on the  patient's presentation of Acute Vomiting and Inability to take oral fluids the patient was given IV fluids. IV Hydration was used because oral hydration was not adequate alone. Upon recheck following hydration, the patient was Improved.    The patient will return for new or worsening symptoms and is stable at the time of discharge.    DISPOSITION:  Patient will be discharged home in stable condition.  I discussed the need to stop using marijuana products as this is likely contributing in a significant way to her syndrome of recurrent vomiting    FINAL IMPRESSION  1. Cannabinoid hyperemesis syndrome          Cesar ZAFAR (Delisa), am scribing for, and in the presence of, Pedro Castro M.D..    Electronically signed by: Cesar Bello (Delisa), 10/7/2021    Pedro ZAFAR M.D. personally performed the services described in this documentation, as scribed by Cesar Bello in my presence, and it is both accurate and complete.    The note accurately reflects work and decisions made by me.  Pedro Castro M.D.  10/7/2021  9:51 AM

## 2021-10-07 NOTE — ED NOTES
Mother at bedside requesting updates on patient status. Pt has given verbal consent to update parent.     Name: Verna Mallory  Phone: [phone]  See updated Emergency Contact

## 2021-10-07 NOTE — DISCHARGE INSTRUCTIONS
I believe you suffer from cannabinoid hyperemesis syndrome and should stop all marijuana products to try to improve your gastrointestinal symptoms over time    Please use Phenergan suppositories for nausea and vomiting and please see [facility] in follow-up or your primary care physician

## 2021-10-07 NOTE — ED NOTES
Patient resting on cart, alert, oriented x 4. Patient updated on POC, verbalizes understanding. Appears more comfortable, no additional emesis, pt medicated per MAR. Denies needs or questions, call light within reach. Patient remains on monitoring.

## 2021-10-09 NOTE — H&P
DATE OF ADMISSION:  10/20/2021     HISTORY OF PRESENT ILLNESS:  The patient is 35 years old  2, para   2-0-0-2, 2 normal vaginal deliveries, scheduled for a laparoscopic bilateral   salpingectomy, here for preop appointment.     MEDICAL HISTORY:  The patient denies any medical issues, high blood pressure,   diabetes or thyroid, is not taking any medications currently.     OBSTETRIC HISTORY:   2, para 2, 2 normal vaginal delivery last delivery   was in .     SOCIAL HISTORY:  History of smoking and admits to social use of alcohol.     PAST SURGICAL HISTORY:  Had some meniscus tear and had repair in 2016.     ALLERGIES:  No known drug allergies.     REVIEW OF SYSTEMS:  The patient is alert and oriented.  Denies shortness of   breath, chest pain or palpitation.  Regular bowel and bladder habits.  The   patient was on birth control pills for contraception. Currently is not   sexually active.  Regular cycles.     GENERAL PHYSICAL EXAMINATION:  VITAL SIGNS:  Stable.  See EMR for current vitals.  CONSTITUTIONAL:  The patient is awake, alert, well-developed, well-nourished   and well groomed.  RESPIRATORY:  The patient is relaxed, breathes without effort.  Lungs are   clear to auscultate and expand symmetrically.  No crackles, wheezes or   rhonchi.  CARDIOVASCULAR:  Rate is normal, rhythm is regular.  S1, S2 normal.  No   murmurs, gallops or rubs.  GASTROINTESTINAL:  Soft, nontender, no guarding or rigidity.  Bowel sounds are   normal.  No palpable masses, no hepatosplenomegaly.     IMPRESSION:  A 35 years old  2, para 2-0-0-2, 2 normal vaginal   deliveries, desires sterilization, here scheduled for laparoscopic bilateral   salpingectomy.  Preoperative instructions was given.  Operative procedure   discussed in detail.  Postoperative recovery explained.  The patient   understands and wants to go ahead with the procedure.  All questions answered   to satisfaction.  Risks inclusive of but not limited  to infection, injury and   bleeding was explained.  Risk of failure rate was discussed and possibility of   ectopic pregnancy in case of failure, needs to be treated right away was   explained. The patient understands and wants to go ahead with the procedure.        ______________________________  MD MABLE Flor/ARSENIO/SARA    DD:  10/08/2021 16:35  DT:  10/08/2021 17:16    Job#:  [id number]

## 2021-10-15 ENCOUNTER — APPOINTMENT (OUTPATIENT)
Dept: ADMISSIONS | Facility: MEDICAL CENTER | Age: 36
End: 2021-10-15
Payer: MEDICAID

## 2021-10-18 ENCOUNTER — PRE-ADMISSION TESTING (OUTPATIENT)
Dept: ADMISSIONS | Facility: MEDICAL CENTER | Age: 36
End: 2021-10-18
Attending: OBSTETRICS & GYNECOLOGY
Payer: MEDICAID

## 2021-10-18 DIAGNOSIS — Z01.812 PRE-OPERATIVE LABORATORY EXAMINATION: ICD-10-CM

## 2021-10-18 LAB
COVID ORDER STATUS COVID19: NORMAL
SARS-COV-2 RNA RESP QL NAA+PROBE: NOTDETECTED
SPECIMEN SOURCE: NORMAL

## 2021-10-18 PROCEDURE — U0003 INFECTIOUS AGENT DETECTION BY NUCLEIC ACID (DNA OR RNA); SEVERE ACUTE RESPIRATORY SYNDROME CORONAVIRUS 2 (SARS-COV-2) (CORONAVIRUS DISEASE [COVID-19]), AMPLIFIED PROBE TECHNIQUE, MAKING USE OF HIGH THROUGHPUT TECHNOLOGIES AS DESCRIBED BY CMS-2020-01-R: HCPCS

## 2021-10-18 PROCEDURE — U0005 INFEC AGEN DETEC AMPLI PROBE: HCPCS

## 2021-10-20 RX ORDER — SODIUM CHLORIDE, SODIUM LACTATE, POTASSIUM CHLORIDE, CALCIUM CHLORIDE 600; 310; 30; 20 MG/100ML; MG/100ML; MG/100ML; MG/100ML
INJECTION, SOLUTION INTRAVENOUS CONTINUOUS
Status: CANCELLED | OUTPATIENT
Start: 2021-10-20 | End: 2021-10-20

## 2021-10-26 ENCOUNTER — OFFICE VISIT (OUTPATIENT)
Dept: MEDICAL GROUP | Facility: CLINIC | Age: 36
End: 2021-10-26
Payer: MEDICAID

## 2021-10-26 VITALS
HEIGHT: 70 IN | OXYGEN SATURATION: 97 % | BODY MASS INDEX: 25.43 KG/M2 | RESPIRATION RATE: 16 BRPM | WEIGHT: 177.6 LBS | DIASTOLIC BLOOD PRESSURE: 82 MMHG | TEMPERATURE: 98.1 F | SYSTOLIC BLOOD PRESSURE: 121 MMHG | HEART RATE: 70 BPM

## 2021-10-26 DIAGNOSIS — K76.0 STEATOSIS OF LIVER: ICD-10-CM

## 2021-10-26 DIAGNOSIS — F41.0 PANIC DISORDER: ICD-10-CM

## 2021-10-26 DIAGNOSIS — Z30.09 STERILIZATION CONSULT: ICD-10-CM

## 2021-10-26 DIAGNOSIS — D22.9 SKIN MOLE: ICD-10-CM

## 2021-10-26 PROBLEM — F33.0 MILD EPISODE OF RECURRENT MAJOR DEPRESSIVE DISORDER (HCC): Status: ACTIVE | Noted: 2019-06-10

## 2021-10-26 PROBLEM — Z72.0 TOBACCO USE: Status: ACTIVE | Noted: 2019-05-17

## 2021-10-26 PROBLEM — F41.9 ANXIETY: Status: ACTIVE | Noted: 2018-10-29

## 2021-10-26 PROBLEM — F63.3 TRICHOTILLOMANIA: Status: ACTIVE | Noted: 2019-01-31

## 2021-10-26 PROBLEM — B00.9 HERPESVIRUS INFECTION: Status: ACTIVE | Noted: 2020-10-01

## 2021-10-26 PROBLEM — G43.909 MIGRAINE: Status: ACTIVE | Noted: 2019-06-13

## 2021-10-26 PROBLEM — Z79.891 ENCOUNTER FOR LONG-TERM METHADONE USE: Status: ACTIVE | Noted: 2019-09-13

## 2021-10-26 PROBLEM — M25.559 PAIN IN JOINT, PELVIC REGION AND THIGH: Status: ACTIVE | Noted: 2018-09-21

## 2021-10-26 PROBLEM — Z86.19 HISTORY OF VIRAL INFECTION: Status: ACTIVE | Noted: 2020-09-10

## 2021-10-26 PROCEDURE — 99213 OFFICE O/P EST LOW 20 MIN: CPT | Mod: GE | Performed by: STUDENT IN AN ORGANIZED HEALTH CARE EDUCATION/TRAINING PROGRAM

## 2021-10-26 RX ORDER — CLONAZEPAM 0.5 MG/1
0.5 TABLET ORAL
Qty: 10 TABLET | Refills: 0 | Status: SHIPPED | OUTPATIENT
Start: 2021-10-26 | End: 2021-11-25

## 2021-10-26 RX ORDER — CLONAZEPAM 0.5 MG/1
0.5 TABLET ORAL
Qty: 10 TABLET | Refills: 0 | Status: SHIPPED | OUTPATIENT
Start: 2021-12-24 | End: 2022-01-23

## 2021-10-26 RX ORDER — METHOCARBAMOL 750 MG/1
TABLET, FILM COATED ORAL
COMMUNITY
Start: 2021-10-19

## 2021-10-26 RX ORDER — GABAPENTIN 300 MG/1
CAPSULE ORAL
COMMUNITY
Start: 2021-10-19 | End: 2022-10-12

## 2021-10-26 RX ORDER — CLONAZEPAM 0.5 MG/1
0.5 TABLET ORAL
Qty: 10 TABLET | Refills: 0 | Status: SHIPPED | OUTPATIENT
Start: 2021-11-25 | End: 2021-12-25

## 2021-10-26 RX ORDER — NAPROXEN 375 MG/1
TABLET ORAL
COMMUNITY
Start: 2021-10-18 | End: 2022-10-12

## 2021-10-26 RX ORDER — CLONAZEPAM 0.5 MG/1
TABLET ORAL
COMMUNITY
Start: 2021-09-27 | End: 2021-10-26 | Stop reason: SDUPTHER

## 2021-10-26 RX ORDER — VALACYCLOVIR HYDROCHLORIDE 500 MG/1
TABLET, FILM COATED ORAL
COMMUNITY
Start: 2020-10-01 | End: 2022-04-20 | Stop reason: SDUPTHER

## 2021-10-26 RX ORDER — SUMATRIPTAN 25 MG/1
TABLET, FILM COATED ORAL
COMMUNITY
Start: 2021-10-12 | End: 2022-01-18 | Stop reason: SDUPTHER

## 2021-10-26 RX ORDER — HYDROXYZINE HYDROCHLORIDE 10 MG/1
TABLET, FILM COATED ORAL
COMMUNITY
Start: 2020-01-26 | End: 2022-08-16

## 2021-10-26 ASSESSMENT — FIBROSIS 4 INDEX: FIB4 SCORE: 0.94

## 2021-10-26 ASSESSMENT — PAIN SCALES - GENERAL: PAINLEVEL: 6=MODERATE PAIN

## 2021-10-26 ASSESSMENT — PATIENT HEALTH QUESTIONNAIRE - PHQ9: CLINICAL INTERPRETATION OF PHQ2 SCORE: 0

## 2021-10-26 NOTE — ASSESSMENT & PLAN NOTE
Refilled 3 months worth of clonazepam 10 tablets a month.Because she was also on narcotics due to hip pain, I will refer her over to a psychiatrist to have continue to have this medication refilled.  I will give her 3 months of medication in the meantime. Patient was advised that taking a narcotic with a benzodiazepine he has an increased risk of death and other bad side effects. Reviewed  and this was appropriate.  Patient to follow-up in 3 months for renewal.  Psychiatry referral placed.

## 2021-10-26 NOTE — ASSESSMENT & PLAN NOTE
Patient with no history of skin cancer.  2 moles examined appear benign.  Referral to dermatology for yearly skin exams placed.

## 2021-10-26 NOTE — PROGRESS NOTES
Subjective:     CC: Medication refill    HPI:   Magui presents today for clonazepam refill.    Problem   Sterilization Consult    Patient does not have any more children.  She currently has 2 children.  She is not on any birth control currently because she reports she is abstinent.  She would like to be sterilized and has had an appointment for this in the past with a gynecologist, but was unable to make this appointment due to the family medical problems.  She needs a new referral to gynecology.     Skin Mole    Patient with multiple moles on her abdomen and lower legs.  She reports she has had a moderate amount of sun exposure in her lifetime.  She would like to have a dermatologist for yearly skin checks.     Panic Disorder    Patient takes clonazepam 2.5 mg about 10 times a month.  She reports that it controls her panic disorder well.  She was previously seeing a psychiatrist with [facility] psychiatry and was well controlled on this and he told her to follow-up with family medicine doctor.       Herpesvirus Infection   History of Viral Infection   Steatosis of Liver   Encounter for Long-Term Methadone Use   Migraine   Mild Episode of Recurrent Major Depressive Disorder (Hcc)   Tobacco Use   Trichotillomania   Anxiety   Pain in Joint, Pelvic Region and Thigh       Current Outpatient Medications Ordered in Epic   Medication Sig Dispense Refill   • hydrOXYzine HCl (ATARAX) 10 MG Tab HYDROXYZINE HCL 10 MG TABS     • valACYclovir (VALTREX) 500 MG Tab VALACYCLOVIR  MG TABS     • clonazePAM (KLONOPIN) 0.5 MG Tab Take 1 Tablet by mouth 1 time a day as needed for up to 30 days. 10 Tablet 0   • [START ON 11/25/2021] clonazePAM (KLONOPIN) 0.5 MG Tab Take 1 Tablet by mouth 1 time a day as needed for up to 30 days. 10 Tablet 0   • [START ON 12/24/2021] clonazePAM (KLONOPIN) 0.5 MG Tab Take 1 Tablet by mouth 1 time a day as needed for up to 30 days. 10 Tablet 0   • gabapentin (NEURONTIN) 300 MG Cap      • methocarbamol  "(ROBAXIN) 750 MG Tab      • naproxen (NAPROSYN) 375 MG Tab      • SUMAtriptan (IMITREX) 25 MG Tab tablet      • promethazine (PHENERGAN) 25 MG Suppos Insert 1 Suppository into the rectum every 6 hours as needed for Nausea/Vomiting. 12 Suppository 0   • Esomeprazole Magnesium (NEXIUM) 10 MG Pack Take  by mouth.     • naproxen (NAPROSYN) 500 MG Tab Take 500 mg by mouth 2 times a day, with meals.     • HYDROcodone-acetaminophen (NORCO) 7.5-325 MG per tablet Take 1-2 Tabs by mouth every 6 hours as needed.     • MICROGESTIN FE 1/20 PO daily (Patient not taking: Reported on 9/30/2021)       No current [facility]-ordered facility-administered medications on file.       ROS:  Gen: no fevers/chills, no changes in weight  Eyes: no changes in vision  ENT: no sore throat, no hearing loss, no bloody nose  Pulm: no sob, no cough  CV: no chest pain, no palpitations  GI: no nausea/vomiting, no diarrhea  : no dysuria  MSk: no myalgias  Skin: no rash  Neuro: no headaches, no numbness/tingling  Heme/Lymph: no easy bruising      Objective:     Exam:  /82 (BP Location: Right arm, Patient Position: Sitting, BP Cuff Size: Adult)   Pulse 70   Temp 36.7 °C (98.1 °F) (Temporal)   Resp 16   Ht 1.778 m (5' 10\")   Wt 80.6 kg (177 lb 9.6 oz)   LMP 09/30/2021   SpO2 97%   BMI 25.48 kg/m²  Body mass index is 25.48 kg/m².    Gen: Alert and oriented, No apparent distress.  Neck: Neck is supple without lymphadenopathy.  Lungs: Normal effort, CTA bilaterally, no wheezes, rhonchi, or rales  CV: Regular rate and rhythm. No murmurs, rubs, or gallops.  Ext: No clubbing, cyanosis, edema.        Labs: none    Assessment & Plan:     35 y.o. female with the following -     Problem List Items Addressed This Visit     Panic disorder     Refilled 3 months worth of clonazepam 10 tablets a month.Because she was also on narcotics due to hip pain, I will refer her over to a psychiatrist to have continue to have this medication refilled.  I will give her 3 " months of medication in the meantime. Patient was advised that taking a narcotic with a benzodiazepine he has an increased risk of death and other bad side effects. Reviewed  and this was appropriate.  Patient to follow-up in 3 months for renewal.  Psychiatry referral placed.         Relevant Medications    hydrOXYzine HCl (ATARAX) 10 MG Tab    clonazePAM (KLONOPIN) 0.5 MG Tab    clonazePAM (KLONOPIN) 0.5 MG Tab (Start on 11/25/2021)    clonazePAM (KLONOPIN) 0.5 MG Tab (Start on 12/24/2021)    Other Relevant Orders    Controlled Substance Treatment Agreement    CBC WITH DIFFERENTIAL    TSH WITH REFLEX TO FT4    REFERRAL TO PSYCHIATRY    Steatosis of liver    Relevant Orders    Comp Metabolic Panel    Sterilization consult     Referral to gynecology for tubal ligation.         Relevant Orders    REFERRAL TO GYNECOLOGY    Skin mole     Patient with no history of skin cancer.  2 moles examined appear benign.  Referral to dermatology for yearly skin exams placed.         Relevant Orders    REFERRAL TO DERMATOLOGY      Other Visit Diagnoses     BMI 25.0-25.9,adult        Relevant Orders    Lipid Profile                Return in about 3 months (around 1/26/2022).    Please note that this dictation was created using voice recognition software. I have made every reasonable attempt to correct obvious errors, but I expect that there are errors of grammar and possibly content that I did not discover before finalizing the note.

## 2021-12-28 ENCOUNTER — OFFICE VISIT (OUTPATIENT)
Dept: MEDICAL GROUP | Facility: CLINIC | Age: 36
End: 2021-12-28
Payer: MEDICAID

## 2021-12-28 VITALS
DIASTOLIC BLOOD PRESSURE: 75 MMHG | SYSTOLIC BLOOD PRESSURE: 116 MMHG | HEIGHT: 70 IN | HEART RATE: 102 BPM | BODY MASS INDEX: 24.48 KG/M2 | OXYGEN SATURATION: 98 % | WEIGHT: 171 LBS

## 2021-12-28 DIAGNOSIS — R79.89 LOW SERUM CORTISOL LEVEL: ICD-10-CM

## 2021-12-28 DIAGNOSIS — R07.9 CHEST PAIN, UNSPECIFIED TYPE: ICD-10-CM

## 2021-12-28 DIAGNOSIS — R29.898 WEAKNESS OF BOTH LOWER EXTREMITIES: ICD-10-CM

## 2021-12-28 PROCEDURE — 99214 OFFICE O/P EST MOD 30 MIN: CPT | Mod: GC | Performed by: STUDENT IN AN ORGANIZED HEALTH CARE EDUCATION/TRAINING PROGRAM

## 2021-12-28 RX ORDER — CELECOXIB 100 MG/1
CAPSULE ORAL
COMMUNITY
Start: 2021-12-21

## 2021-12-28 RX ORDER — NALOXONE HYDROCHLORIDE 4 MG/.1ML
SPRAY NASAL
Status: ON HOLD | COMMUNITY
Start: 2021-12-23 | End: 2023-03-03

## 2021-12-28 RX ORDER — METOCLOPRAMIDE 10 MG/1
TABLET ORAL
COMMUNITY
Start: 2021-11-18 | End: 2022-08-19 | Stop reason: SDUPTHER

## 2021-12-28 RX ORDER — MORPHINE SULFATE 15 MG/1
TABLET, FILM COATED, EXTENDED RELEASE ORAL
COMMUNITY
Start: 2021-12-23 | End: 2024-03-21

## 2021-12-28 ASSESSMENT — FIBROSIS 4 INDEX: FIB4 SCORE: .962250448649376274

## 2021-12-28 ASSESSMENT — PATIENT HEALTH QUESTIONNAIRE - PHQ9
2. FEELING DOWN, DEPRESSED, IRRITABLE, OR HOPELESS: NOT AT ALL
SUM OF ALL RESPONSES TO PHQ QUESTIONS 1-9: 7
1. LITTLE INTEREST OR PLEASURE IN DOING THINGS: NOT AT ALL
9. THOUGHTS THAT YOU WOULD BE BETTER OFF DEAD, OR OF HURTING YOURSELF: NOT AT ALL
SUM OF ALL RESPONSES TO PHQ9 QUESTIONS 1 AND 2: 0
4. FEELING TIRED OR HAVING LITTLE ENERGY: NEARLY EVERY DAY
7. TROUBLE CONCENTRATING ON THINGS, SUCH AS READING THE NEWSPAPER OR WATCHING TELEVISION: NEARLY EVERY DAY
3. TROUBLE FALLING OR STAYING ASLEEP OR SLEEPING TOO MUCH: SEVERAL DAYS
6. FEELING BAD ABOUT YOURSELF - OR THAT YOU ARE A FAILURE OR HAVE LET YOURSELF OR YOUR FAMILY DOWN: NOT AL ALL
8. MOVING OR SPEAKING SO SLOWLY THAT OTHER PEOPLE COULD HAVE NOTICED. OR THE OPPOSITE, BEING SO FIGETY OR RESTLESS THAT YOU HAVE BEEN MOVING AROUND A LOT MORE THAN USUAL: NOT AT ALL
5. POOR APPETITE OR OVEREATING: NOT AT ALL

## 2021-12-28 NOTE — PROGRESS NOTES
Subjective:     CC: questions about online labs that were done.     HPI:   Magui presents today with     Seeing a psychiatrist and getting her clonazepam from them.     Seeing pain pain management. Now taking morphine ER instead of Norco. Also trying to get in with an orthopedic surgeon.     Did some send out lab tests and is questioning certain levels. She reports she is always exhausted doing simple things like making food and waking up. She wants to test her testosterone.    Patient with concerns for MS. She has a family history of dementia. She reports lack of balance and weakness in her arms and legs that comes and goes. No major falls   She feels very exhausted all the time. She reports this started at age 35. She is still having periods, but they have been very heavy lately and she was late this month. There is a lot of problems with menopause. She is very worried about her hormone levels since her cortisol and testosterone was low on this other test she did. She would like to talk with someone more in depth about her hormones.     She is also reporting chest pain in the middle of her chest that does not radiate. Happens mostly at night after she lays down. She takes omeprazole occasionally for heartburn. No palpitations or radiating pain.       Current Outpatient Medications Ordered in Epic   Medication Sig Dispense Refill   • celecoxib (CELEBREX) 100 MG Cap      • metoclopramide (REGLAN) 10 MG Tab      • morphine ER (MS CONTIN) 15 MG Tab CR tablet      • NARCAN 4 MG/0.1ML Liquid      • gabapentin (NEURONTIN) 300 MG Cap      • hydrOXYzine HCl (ATARAX) 10 MG Tab HYDROXYZINE HCL 10 MG TABS     • methocarbamol (ROBAXIN) 750 MG Tab      • naproxen (NAPROSYN) 375 MG Tab      • SUMAtriptan (IMITREX) 25 MG Tab tablet      • valACYclovir (VALTREX) 500 MG Tab VALACYCLOVIR  MG TABS     • clonazePAM (KLONOPIN) 0.5 MG Tab Take 1 Tablet by mouth 1 time a day as needed for up to 30 days. 10 Tablet 0   •  "promethazine (PHENERGAN) 25 MG Suppos Insert 1 Suppository into the rectum every 6 hours as needed for Nausea/Vomiting. 12 Suppository 0   • Esomeprazole Magnesium (NEXIUM) 10 MG Pack Take  by mouth.     • naproxen (NAPROSYN) 500 MG Tab Take 500 mg by mouth 2 times a day, with meals.     • HYDROcodone-acetaminophen (NORCO) 7.5-325 MG per tablet Take 1-2 Tabs by mouth every 6 hours as needed.     • MICROGESTIN FE 1/20 PO daily (Patient not taking: Reported on 9/30/2021)       No current [facility]-ordered facility-administered medications on file.         ROS:  Gen: no fevers/chills, no changes in weight  Pulm: no sob, no cough      Objective:     Exam:  /75 (BP Location: Right arm, Patient Position: Sitting)   Pulse (!) 102   Ht 1.778 m (5' 10\")   Wt 77.6 kg (171 lb)   SpO2 98%   BMI 24.54 kg/m²  Body mass index is 24.54 kg/m².    Gen: Alert and oriented, No apparent distress.  Neck: Neck is supple without lymphadenopathy.  Lungs: Normal effort, CTA bilaterally, no wheezes, rhonchi, or rales  CV: Regular rate and rhythm. No murmurs, rubs, or gallops.  Ext: No clubbing, cyanosis, edema.      Assessment & Plan:     36 y.o. female with the following -     Problem List Items Addressed This Visit     Weakness of both lower extremities     Patient reports weakness and numbness that is on and off in her upper and lower extremities. She is worried she may have MS. I ordered an MRI >6 months ago for her brain and she has not done this. I will reorder this for the patient.          Relevant Orders    MR-BRAIN-WITH & W/O    Low serum cortisol level (HCC)     Patient reports she did some mail in labs. She reports her cortisol and testosterone were low at that time. I advised her that we typically do not measure these labs, but she really wants her hormones looked into because she states she has been very tired.   I will refer her to endocrinology for further work up.         Relevant Orders    Referral to Endocrinology    " Chest pain     Patient reports chest pain in the middle of her chest that occurs at night when laying down for the past 1 month. This could be due to costochondritis vs gerd vs anxiety vs cardiac causes. The most likely diagnosis at this time is GERD.  Advised patient to take her omeprazole daily 30 minutes before eating and to not eat for >2 hours prior to bed.   Advised patient to do calming activities such as meditation or mindfulness at night before bed.   Follow up in 1 month, will reevaluate at that time    I ordered labs for the patient a few months ago that she did not get done. Lab slip printed out for the patient, will follow up in 1 month to go over these labs.                  Return in about 4 weeks (around 1/25/2022). Will review labs at that appointment.     Please note that this dictation was created using voice recognition software. I have made every reasonable attempt to correct obvious errors, but I expect that there are errors of grammar and possibly content that I did not discover before finalizing the note.

## 2021-12-28 NOTE — ASSESSMENT & PLAN NOTE
Patient reports chest pain in the middle of her chest that occurs at night when laying down for the past 1 month. This could be due to costochondritis vs gerd vs anxiety vs cardiac causes. The most likely diagnosis at this time is GERD.  Advised patient to take her omeprazole daily 30 minutes before eating and to not eat for >2 hours prior to bed.   Advised patient to do calming activities such as meditation or mindfulness at night before bed.   Follow up in 1 month, will reevaluate at that time    I ordered labs for the patient a few months ago that she did not get done. Lab slip printed out for the patient, will follow up in 1 month to go over these labs.

## 2021-12-28 NOTE — ASSESSMENT & PLAN NOTE
Patient reports she did some mail in labs. She reports her cortisol and testosterone were low at that time. I advised her that we typically do not measure these labs, but she really wants her hormones looked into because she states she has been very tired.   I will refer her to endocrinology for further work up.

## 2021-12-28 NOTE — ASSESSMENT & PLAN NOTE
Patient reports weakness and numbness that is on and off in her upper and lower extremities. She is worried she may have MS. I ordered an MRI >6 months ago for her brain and she has not done this. I will reorder this for the patient.

## 2022-01-18 NOTE — TELEPHONE ENCOUNTER
Received request via: Pharmacy    Was the patient seen in the last year in this department? Yes    Does the patient have an active prescription (recently filled or refills available) for medication(s) requested? No     PATIENT ALSO REQUESTING DICYCLOMINE 20MG 1PO 4X DAILY PRN. DID NOT SEE ON CHART, PLEASE ADIVSE

## 2022-01-20 RX ORDER — SUMATRIPTAN 25 MG/1
25-100 TABLET, FILM COATED ORAL
Qty: 10 TABLET | Refills: 3 | Status: SHIPPED | OUTPATIENT
Start: 2022-01-20 | End: 2022-05-24

## 2022-01-31 ENCOUNTER — TELEPHONE (OUTPATIENT)
Dept: MEDICAL GROUP | Facility: CLINIC | Age: 37
End: 2022-01-31

## 2022-01-31 DIAGNOSIS — R79.89 LOW SERUM CORTISOL LEVEL: ICD-10-CM

## 2022-01-31 RX ORDER — DICYCLOMINE HCL 20 MG
20 TABLET ORAL EVERY 6 HOURS
Qty: 30 TABLET | Refills: 3 | Status: ON HOLD | OUTPATIENT
Start: 2022-01-31 | End: 2023-03-03

## 2022-01-31 NOTE — TELEPHONE ENCOUNTER
Patient called in regards to her   dicyclomine 20mg PRN    This medication is not in her chart and she needs a refill. I could not add it in her medications, but I told the patient I would send this over to you for review.

## 2022-01-31 NOTE — TELEPHONE ENCOUNTER
Patient called regarding her Endocrinology referral. I looked through her chart and it looks like the referral was sent to [facility]... which is permanently closed. If you can put another order in I will call our referrals department and give them that information of their closure.   Thank you!

## 2022-03-31 ENCOUNTER — APPOINTMENT (OUTPATIENT)
Dept: RADIOLOGY | Facility: MEDICAL CENTER | Age: 37
End: 2022-03-31
Attending: NURSE PRACTITIONER
Payer: MEDICAID

## 2022-04-20 ENCOUNTER — HOSPITAL ENCOUNTER (OUTPATIENT)
Dept: RADIOLOGY | Facility: MEDICAL CENTER | Age: 37
End: 2022-04-20
Attending: NURSE PRACTITIONER
Payer: MEDICAID

## 2022-04-20 DIAGNOSIS — M54.50 LOW BACK PAIN, UNSPECIFIED BACK PAIN LATERALITY, UNSPECIFIED CHRONICITY, UNSPECIFIED WHETHER SCIATICA PRESENT: ICD-10-CM

## 2022-04-20 DIAGNOSIS — M25.552 LEFT HIP PAIN: ICD-10-CM

## 2022-04-20 DIAGNOSIS — M25.551 RIGHT HIP PAIN: ICD-10-CM

## 2022-04-20 PROCEDURE — 73521 X-RAY EXAM HIPS BI 2 VIEWS: CPT

## 2022-04-20 PROCEDURE — 72110 X-RAY EXAM L-2 SPINE 4/>VWS: CPT

## 2022-04-21 ENCOUNTER — TELEPHONE (OUTPATIENT)
Dept: MEDICAL GROUP | Facility: CLINIC | Age: 37
End: 2022-04-21
Payer: MEDICAID

## 2022-04-21 RX ORDER — VALACYCLOVIR HYDROCHLORIDE 500 MG/1
500 TABLET, FILM COATED ORAL 2 TIMES DAILY
Qty: 40 TABLET | Refills: 2 | Status: SHIPPED | OUTPATIENT
Start: 2022-04-21 | End: 2022-04-24

## 2022-04-21 NOTE — TELEPHONE ENCOUNTER
Caller Name: Magui Mallory    Call Back Number: [phone] (home)       Patient called and would like to get a refill of certrazine that Dr. Adams was refilling. I did not see this on her chart. Thank you

## 2022-05-24 RX ORDER — SUMATRIPTAN 25 MG/1
25-100 TABLET, FILM COATED ORAL
Qty: 9 TABLET | Refills: 4 | Status: SHIPPED | OUTPATIENT
Start: 2022-05-24 | End: 2022-11-14 | Stop reason: SDUPTHER

## 2022-08-15 RX ORDER — VALACYCLOVIR HYDROCHLORIDE 500 MG/1
500 TABLET, FILM COATED ORAL 2 TIMES DAILY
COMMUNITY
End: 2022-10-03 | Stop reason: SDUPTHER

## 2022-08-16 ENCOUNTER — OFFICE VISIT (OUTPATIENT)
Dept: MEDICAL GROUP | Facility: CLINIC | Age: 37
End: 2022-08-16
Payer: MEDICAID

## 2022-08-16 VITALS — HEIGHT: 70 IN | WEIGHT: 175 LBS | BODY MASS INDEX: 25.05 KG/M2

## 2022-08-16 DIAGNOSIS — M25.559 HIP PAIN: ICD-10-CM

## 2022-08-16 DIAGNOSIS — M25.551 PAIN OF RIGHT HIP JOINT: ICD-10-CM

## 2022-08-16 PROCEDURE — 99213 OFFICE O/P EST LOW 20 MIN: CPT | Mod: GE

## 2022-08-16 RX ORDER — HYDROXYZINE HYDROCHLORIDE 10 MG/1
TABLET, FILM COATED ORAL
COMMUNITY
Start: 2020-01-26 | End: 2022-10-12

## 2022-08-16 RX ORDER — COVID-19 ANTIGEN TEST
KIT MISCELLANEOUS
COMMUNITY
End: 2022-08-16

## 2022-08-16 RX ORDER — PROMETHAZINE HYDROCHLORIDE 25 MG/1
SUPPOSITORY RECTAL
COMMUNITY
Start: 2019-11-12 | End: 2022-08-16

## 2022-08-16 RX ORDER — CLONAZEPAM 0.5 MG/1
TABLET ORAL
COMMUNITY
Start: 2019-01-31 | End: 2022-10-12 | Stop reason: SDUPTHER

## 2022-08-16 RX ORDER — ACETAMINOPHEN 325 MG/1
TABLET ORAL
COMMUNITY
End: 2022-10-12

## 2022-08-16 RX ORDER — SUMATRIPTAN 25 MG/1
TABLET, FILM COATED ORAL
COMMUNITY
Start: 2019-06-13 | End: 2022-08-16

## 2022-08-16 RX ORDER — HYDROXYZINE HYDROCHLORIDE 25 MG/1
TABLET, FILM COATED ORAL
COMMUNITY
Start: 2019-09-09 | End: 2022-10-12

## 2022-08-16 RX ORDER — METOCLOPRAMIDE 10 MG/1
TABLET ORAL
COMMUNITY
Start: 2021-07-16 | End: 2022-08-16

## 2022-08-16 RX ORDER — NAPROXEN SODIUM 220 MG
TABLET ORAL
COMMUNITY
End: 2022-10-12

## 2022-08-16 ASSESSMENT — FIBROSIS 4 INDEX: FIB4 SCORE: .962250448649376274

## 2022-08-16 NOTE — ASSESSMENT & PLAN NOTE
3-month history of right-sided hip/gluteal pain/tightness following audible pop during a workout.  Likely abductor and piriformis injury.  -Refer to physical therapy  -Follow-up in 1 month

## 2022-08-16 NOTE — PROGRESS NOTES
Subjective:     CC: Hip pain    HPI:   Magui presents today with 3-month history of right hip pain following an audible pop while working out.  Patient reports pain/tightness deep within the gluteus region primarily felt with abduction.  Patient has tried stretching, local ice massage, and anti-inflammatories with minimal improvement.  Patient has extensive history of hip and low back pain.  Prior imaging indicates mild arthritic change of hips bilaterally and minimal impingement of the left hip.  Patient also notes mild tingling to the lateral aspect of the left shin.  This symptom is not causing her any pain or discomfort and just wanted to make sure it was nothing concerning.      Current Outpatient Medications Ordered in Epic   Medication Sig Dispense Refill    clonazePAM (KLONOPIN) 0.5 MG Tab CLONAZEPAM 0.5 MG TABS      valACYclovir (VALTREX) 500 MG Tab Take 500 mg by mouth 2 times a day.      SUMAtriptan (IMITREX) 25 MG Tab tablet TAKE 1-4 TABLETS BY MOUTH ONE TIME AS NEEDED FOR MIGRAINE FOR UP TO 1 DOSE. 9 Tablet 4    celecoxib (CELEBREX) 100 MG Cap       Esomeprazole Magnesium 10 MG Pack Take  by mouth.      HYDROcodone-acetaminophen (NORCO) 7.5-325 MG per tablet Take 1-2 Tabs by mouth every 6 hours as needed.      acetaminophen (TYLENOL) 325 MG Tab Take  by mouth. (Patient not taking: Reported on 8/16/2022)      hydrOXYzine HCl (ATARAX) 10 MG Tab HYDROXYZINE HCL 10 MG TABS (Patient not taking: Reported on 8/16/2022)      hydrOXYzine HCl (ATARAX) 25 MG Tab HYDROXYZINE HCL 25 MG TABS (Patient not taking: Reported on 8/16/2022)      naproxen (ANAPROX) 220 MG tablet ALEVE 220 MG TABS (Patient not taking: Reported on 8/16/2022)      dicyclomine (BENTYL) 20 MG Tab Take 1 Tablet by mouth every 6 hours. 30 Tablet 3    metoclopramide (REGLAN) 10 MG Tab       morphine ER (MS CONTIN) 15 MG Tab CR tablet       NARCAN 4 MG/0.1ML Liquid       gabapentin (NEURONTIN) 300 MG Cap  (Patient not taking: Reported on 8/16/2022)   "    methocarbamol (ROBAXIN) 750 MG Tab       naproxen (NAPROSYN) 375 MG Tab  (Patient not taking: Reported on 8/16/2022)      promethazine (PHENERGAN) 25 MG Suppos Insert 1 Suppository into the rectum every 6 hours as needed for Nausea/Vomiting. 12 Suppository 0    naproxen (NAPROSYN) 500 MG Tab Take 500 mg by mouth 2 times a day, with meals. (Patient not taking: Reported on 8/16/2022)      MICROGESTIN FE 1/20 PO daily (Patient not taking: Reported on 9/30/2021)       No current [facility]-ordered facility-administered medications on file.       ROS:  Gen: no fevers/chills, no changes in weight  Pulm: no sob, no cough  CV: no chest pain, no palpitations  GI: no nausea/vomiting, no diarrhea    Objective:     Exam:  BP (P) 119/84   Pulse (P) 88   Temp (P) 36.1 °C (97 °F) (Temporal)   Resp (P) 16   Ht 1.778 m (5' 10\")   Wt 79.4 kg (175 lb)   SpO2 (P) 97%   BMI 25.11 kg/m²  Body mass index is 25.11 kg/m².    Gen: Alert and oriented, No apparent distress.  Neck: Neck is supple without lymphadenopathy.  Lungs: Normal effort, CTA bilaterally, no wheezes, rhonchi, or rales  CV: Regular rate and rhythm. No murmurs, rubs, or gallops.  Ext: Mild right hip instability, mild right-sided adduction weakness, full range of motion, no tenderness to palpation, no gross deformity    Labs: none    Assessment & Plan:     36 y.o. female with the following -   Problem List Items Addressed This Visit       Arthralgia of hip     3-month history of right-sided hip/gluteal pain/tightness following audible pop during a workout.  Likely abductor and piriformis injury.  -Refer to physical therapy  -Follow-up in 1 month          Other Visit Diagnoses       Hip pain        Relevant Orders    Referral to Physical Therapy               Tony Umaña M.D.  PGY1         "

## 2022-08-18 RX ORDER — METOCLOPRAMIDE 10 MG/1
10 TABLET ORAL 4 TIMES DAILY
Qty: 90 TABLET | Refills: 11 | OUTPATIENT
Start: 2022-08-18

## 2022-08-18 RX ORDER — VALACYCLOVIR HYDROCHLORIDE 500 MG/1
500 TABLET, FILM COATED ORAL 2 TIMES DAILY
Qty: 40 TABLET | Refills: 2 | OUTPATIENT
Start: 2022-08-18

## 2022-08-19 RX ORDER — METOCLOPRAMIDE 10 MG/1
10 TABLET ORAL 4 TIMES DAILY
Qty: 120 TABLET | Refills: 6 | Status: ON HOLD | OUTPATIENT
Start: 2022-08-19 | End: 2023-03-03

## 2022-10-04 RX ORDER — VALACYCLOVIR HYDROCHLORIDE 500 MG/1
500 TABLET, FILM COATED ORAL 2 TIMES DAILY
Qty: 90 TABLET | Refills: 0 | Status: SHIPPED | OUTPATIENT
Start: 2022-10-04 | End: 2023-04-25 | Stop reason: SDUPTHER

## 2022-10-12 ENCOUNTER — OFFICE VISIT (OUTPATIENT)
Dept: MEDICAL GROUP | Facility: CLINIC | Age: 37
End: 2022-10-12
Payer: MEDICAID

## 2022-10-12 VITALS
WEIGHT: 171 LBS | RESPIRATION RATE: 16 BRPM | BODY MASS INDEX: 24.48 KG/M2 | TEMPERATURE: 97.1 F | HEIGHT: 70 IN | OXYGEN SATURATION: 95 % | HEART RATE: 68 BPM

## 2022-10-12 DIAGNOSIS — Z30.09 FAMILY PLANNING: ICD-10-CM

## 2022-10-12 DIAGNOSIS — N76.0 BACTERIAL VAGINOSIS: ICD-10-CM

## 2022-10-12 DIAGNOSIS — F41.0 PANIC DISORDER: ICD-10-CM

## 2022-10-12 DIAGNOSIS — F41.0 PANIC ATTACKS: ICD-10-CM

## 2022-10-12 DIAGNOSIS — B96.89 BACTERIAL VAGINOSIS: ICD-10-CM

## 2022-10-12 PROCEDURE — 99214 OFFICE O/P EST MOD 30 MIN: CPT | Mod: GC

## 2022-10-12 RX ORDER — METRONIDAZOLE 500 MG/1
500 TABLET ORAL 2 TIMES DAILY
Qty: 14 TABLET | Refills: 1 | Status: SHIPPED | OUTPATIENT
Start: 2022-10-12 | End: 2023-02-13

## 2022-10-12 RX ORDER — CLONAZEPAM 0.5 MG/1
TABLET ORAL
Qty: 10 TABLET | Refills: 1 | Status: SHIPPED | OUTPATIENT
Start: 2022-10-12 | End: 2022-12-15

## 2022-10-12 ASSESSMENT — PATIENT HEALTH QUESTIONNAIRE - PHQ9: CLINICAL INTERPRETATION OF PHQ2 SCORE: 0

## 2022-10-12 ASSESSMENT — FIBROSIS 4 INDEX: FIB4 SCORE: .962250448649376274

## 2022-10-12 NOTE — PROGRESS NOTES
"Subjective:     CC: Refill medication and referrals    HPI:   Magui Mallory is a 36 year old female who presents today for medication refill and referrals. Patient reports she currently experiencing her \"usual\" bacterial vaginosis symptoms for the past day. She states she recently was sexually active and will get bacterial vaginosis after wards. Her symptoms started yesterday morning and symptoms include \"fishy odor\", mild discharge, and itchiness.     Patient also would like a referral to [facility]. She would like tubal ligation. She states she was sexually active but is no longer. She states she monitors her cycles as birth control at the moment and is not interested in other forms of birth control.     Patient also reports her psychiatrist recently left and she needs a new referral to a new provider. She states she sees a psychiatrist for her clonazepam prescription, she states she uses it 3-4 times a month for panic attacks.     Current Outpatient Medications Ordered in Epic   Medication Sig Dispense Refill    valACYclovir (VALTREX) 500 MG Tab Take 1 Tablet by mouth 2 times a day. 90 Tablet 0    metoclopramide (REGLAN) 10 MG Tab Take 1 Tablet by mouth 4 times a day. 120 Tablet 6    SUMAtriptan (IMITREX) 25 MG Tab tablet TAKE 1-4 TABLETS BY MOUTH ONE TIME AS NEEDED FOR MIGRAINE FOR UP TO 1 DOSE. 9 Tablet 4    morphine ER (MS CONTIN) 15 MG Tab CR tablet       methocarbamol (ROBAXIN) 750 MG Tab       promethazine (PHENERGAN) 25 MG Suppos Insert 1 Suppository into the rectum every 6 hours as needed for Nausea/Vomiting. 12 Suppository 0    acetaminophen (TYLENOL) 325 MG Tab Take  by mouth. (Patient not taking: Reported on 8/16/2022)      clonazePAM (KLONOPIN) 0.5 MG Tab CLONAZEPAM 0.5 MG TABS      dicyclomine (BENTYL) 20 MG Tab Take 1 Tablet by mouth every 6 hours. 30 Tablet 3    celecoxib (CELEBREX) 100 MG Cap       NARCAN 4 MG/0.1ML Liquid  (Patient not taking: Reported on 10/12/2022)      Esomeprazole " "Magnesium 10 MG Pack Take  by mouth.       No current Epic-ordered facility-administered medications on file.     ROS:  Gen: no fevers/chills, no changes in weight  Pulm: no sob, no cough  CV: no chest pain, no palpitations  GI: no nausea/vomiting, no diarrhea    Objective:     Exam:  Pulse 68   Temp 36.2 °C (97.1 °F) (Temporal)   Resp 16   Ht 1.778 m (5' 10\")   Wt 77.6 kg (171 lb)   SpO2 95%   BMI 24.54 kg/m²  Body mass index is 24.54 kg/m².    Gen: Alert and oriented, No apparent distress.  Neck: Neck is supple without lymphadenopathy.  Lungs: Normal effort, CTA bilaterally, no wheezes, rhonchi, or rales  CV: Regular rate and rhythm. No murmurs, rubs, or gallops.  Ext: No clubbing, cyanosis, edema.    Labs: none     Assessment & Plan:     36 y.o. female with the following -     1. Bacterial vaginosis  Patient reports she is experiencing her current bacterial vaginosis symptoms including fishy odor, white discharge, and itchiness. I advised patient to take prescription and if her symptoms do not resolve can return to clinic for further work up.    Plan:   - Prescription for metronidazole 500 mg BID X 7 days.      2. Panic attacks   Patient has history of panic attacks. She states she usually sees a psychiatrist for clonazepam prescription as needed. She takes it sparingly, 3-4 times a month for panic attacks. I discussed with patient we can fill her prescription. I also advised patient to reestablish with a therapist for further management of panic attacks.   Plan:   - Refill clonazepam prescription     3. Family planning   Patient is interested in tubal ligation. She currently tracks her menstrual cycle as a form of birth control and is not interested in other forms of birth control at the moment.   Plan:   - Referral to OBGYN     Return if symptoms worsen or fail to improve.    Jihan So M.D.  PGY1         "

## 2022-11-15 NOTE — TELEPHONE ENCOUNTER
Received request via: Pharmacy    Was the patient seen in the last year in this department? Yes    Does the patient have an active prescription (recently filled or refills available) for medication(s) requested? No    Does the patient have [facility] and need 100 day supply (blood pressure, diabetes and cholesterol meds only)? Patient does not have SCP

## 2022-11-23 RX ORDER — SUMATRIPTAN 25 MG/1
25-100 TABLET, FILM COATED ORAL
Qty: 9 TABLET | Refills: 4 | Status: ON HOLD | OUTPATIENT
Start: 2022-11-23 | End: 2023-03-03

## 2022-12-14 DIAGNOSIS — F41.0 PANIC DISORDER: ICD-10-CM

## 2022-12-15 RX ORDER — CLONAZEPAM 0.5 MG/1
TABLET ORAL
Qty: 10 TABLET | Refills: 1 | Status: SHIPPED | OUTPATIENT
Start: 2022-12-15 | End: 2023-01-14

## 2022-12-22 ENCOUNTER — OFFICE VISIT (OUTPATIENT)
Dept: MEDICAL GROUP | Facility: CLINIC | Age: 37
End: 2022-12-22
Payer: MEDICAID

## 2022-12-22 VITALS
BODY MASS INDEX: 22.82 KG/M2 | DIASTOLIC BLOOD PRESSURE: 74 MMHG | WEIGHT: 163 LBS | HEART RATE: 86 BPM | OXYGEN SATURATION: 95 % | HEIGHT: 71 IN | TEMPERATURE: 97.3 F | SYSTOLIC BLOOD PRESSURE: 104 MMHG

## 2022-12-22 DIAGNOSIS — R51.9 INTRACTABLE HEADACHE, UNSPECIFIED CHRONICITY PATTERN, UNSPECIFIED HEADACHE TYPE: ICD-10-CM

## 2022-12-22 PROCEDURE — 99213 OFFICE O/P EST LOW 20 MIN: CPT | Mod: GC | Performed by: STUDENT IN AN ORGANIZED HEALTH CARE EDUCATION/TRAINING PROGRAM

## 2022-12-22 ASSESSMENT — FIBROSIS 4 INDEX: FIB4 SCORE: 0.99

## 2022-12-23 NOTE — ASSESSMENT & PLAN NOTE
Chronic.  Patient has chronic headache lasting 2 months consistent with tension type headache.  She does also have a significant history of migraines, which she does not believe are similar.  Will refer to neurology for further evaluation.  Continue current regimen for control of symptoms.  She does acknowledge multiple life stressors which may be contributory and is working to see a therapist.  -Referral to neurology  -Follow-up if no improvement or worsening of symptoms

## 2022-12-23 NOTE — PROGRESS NOTES
"Subjective:     CC: Headache     HPI:   Magui presents today with    Problem   Headache    Patient reports 2 months of constant headache.  Headache is located in frontal and bitemporal region.  Intensity waxes and wanes.  Denies waking up from headache.  No nausea or vomiting.  She does experience light sensitivity.  No focal numbness or paresthesias.  She has been treating with her pain regimen which includes Celebrex, Robaxin and morphine.    She does have a history of migraines and does not believe the symptoms are similar to symptoms associated with migraines.                     ROS: See HPI.     Objective:     Exam:  /74 (BP Location: Right arm, Patient Position: Sitting)   Pulse 86   Temp 36.3 °C (97.3 °F)   Ht 1.803 m (5' 11\")   Wt 73.9 kg (163 lb)   SpO2 95%   BMI 22.73 kg/m²  Body mass index is 22.73 kg/m².    Physical Exam:  General: Pt resting in NAD, cooperative   Skin:  No cyanosis or jaundice   HEENT: NC/AT. EOMI. No conjunctival injection or sclera icterus.   Lungs:  CTAB, good air movement. Non-labored.   Cardiovascular:  S1/S2 RRR   Abdomen:  Abdomen is soft, non-tender, non-distended, +BS  Extremities:  No LE edema   CNS:  No gross focal neurologic deficits.  Cranial nerves II-XII grossly intact.  Sensation motor grossly intact all 4 extremities.  Psych: Appropriate mood and affect     Assessment & Plan:     37 y.o. female with the following -     Problem List Items Addressed This Visit       Headache     Chronic.  Patient has chronic headache lasting 2 months consistent with tension type headache.  She does also have a significant history of migraines, which she does not believe are similar.  Will refer to neurology for further evaluation.  Continue current regimen for control of symptoms.  She does acknowledge multiple life stressors which may be contributory and is working to see a therapist.  -Referral to neurology  -Follow-up if no improvement or worsening of symptoms         " Relevant Orders    Referral to Neurology

## 2023-02-13 ENCOUNTER — PRE-ADMISSION TESTING (OUTPATIENT)
Dept: ADMISSIONS | Facility: MEDICAL CENTER | Age: 38
End: 2023-02-13
Attending: OBSTETRICS & GYNECOLOGY
Payer: MEDICAID

## 2023-02-13 DIAGNOSIS — Z01.812 PRE-OPERATIVE LABORATORY EXAMINATION: ICD-10-CM

## 2023-02-13 LAB
ALBUMIN SERPL BCP-MCNC: 4.7 G/DL (ref 3.2–4.9)
ALBUMIN/GLOB SERPL: 2.4 G/DL
ALP SERPL-CCNC: 55 U/L (ref 30–99)
ALT SERPL-CCNC: 10 U/L (ref 2–50)
ANION GAP SERPL CALC-SCNC: 8 MMOL/L (ref 7–16)
APPEARANCE UR: CLEAR
AST SERPL-CCNC: 15 U/L (ref 12–45)
BACTERIA #/AREA URNS HPF: NEGATIVE /HPF
BASOPHILS # BLD AUTO: 0.4 % (ref 0–1.8)
BASOPHILS # BLD: 0.02 K/UL (ref 0–0.12)
BILIRUB SERPL-MCNC: 0.6 MG/DL (ref 0.1–1.5)
BILIRUB UR QL STRIP.AUTO: NEGATIVE
BUN SERPL-MCNC: 9 MG/DL (ref 8–22)
CALCIUM ALBUM COR SERPL-MCNC: 8.6 MG/DL (ref 8.5–10.5)
CALCIUM SERPL-MCNC: 9.2 MG/DL (ref 8.5–10.5)
CHLORIDE SERPL-SCNC: 105 MMOL/L (ref 96–112)
CO2 SERPL-SCNC: 25 MMOL/L (ref 20–33)
COLOR UR: YELLOW
CREAT SERPL-MCNC: 0.72 MG/DL (ref 0.5–1.4)
EOSINOPHIL # BLD AUTO: 0.03 K/UL (ref 0–0.51)
EOSINOPHIL NFR BLD: 0.6 % (ref 0–6.9)
EPI CELLS #/AREA URNS HPF: ABNORMAL /HPF
ERYTHROCYTE [DISTWIDTH] IN BLOOD BY AUTOMATED COUNT: 45.9 FL (ref 35.9–50)
GFR SERPLBLD CREATININE-BSD FMLA CKD-EPI: 110 ML/MIN/1.73 M 2
GLOBULIN SER CALC-MCNC: 2 G/DL (ref 1.9–3.5)
GLUCOSE SERPL-MCNC: 120 MG/DL (ref 65–99)
GLUCOSE UR STRIP.AUTO-MCNC: NEGATIVE MG/DL
HCG SERPL QL: NEGATIVE
HCT VFR BLD AUTO: 41.3 % (ref 37–47)
HGB BLD-MCNC: 13.8 G/DL (ref 12–16)
HYALINE CASTS #/AREA URNS LPF: ABNORMAL /LPF
IMM GRANULOCYTES # BLD AUTO: 0.02 K/UL (ref 0–0.11)
IMM GRANULOCYTES NFR BLD AUTO: 0.4 % (ref 0–0.9)
KETONES UR STRIP.AUTO-MCNC: ABNORMAL MG/DL
LEUKOCYTE ESTERASE UR QL STRIP.AUTO: NEGATIVE
LYMPHOCYTES # BLD AUTO: 1.48 K/UL (ref 1–4.8)
LYMPHOCYTES NFR BLD: 27.9 % (ref 22–41)
MCH RBC QN AUTO: 31.9 PG (ref 27–33)
MCHC RBC AUTO-ENTMCNC: 33.4 G/DL (ref 33.6–35)
MCV RBC AUTO: 95.6 FL (ref 81.4–97.8)
MICRO URNS: ABNORMAL
MONOCYTES # BLD AUTO: 0.37 K/UL (ref 0–0.85)
MONOCYTES NFR BLD AUTO: 7 % (ref 0–13.4)
NEUTROPHILS # BLD AUTO: 3.39 K/UL (ref 2–7.15)
NEUTROPHILS NFR BLD: 63.7 % (ref 44–72)
NITRITE UR QL STRIP.AUTO: NEGATIVE
NRBC # BLD AUTO: 0 K/UL
NRBC BLD-RTO: 0 /100 WBC
PH UR STRIP.AUTO: 7 [PH] (ref 5–8)
PLATELET # BLD AUTO: 217 K/UL (ref 164–446)
PMV BLD AUTO: 10.8 FL (ref 9–12.9)
POTASSIUM SERPL-SCNC: 4 MMOL/L (ref 3.6–5.5)
PROT SERPL-MCNC: 6.7 G/DL (ref 6–8.2)
PROT UR QL STRIP: 100 MG/DL
RBC # BLD AUTO: 4.32 M/UL (ref 4.2–5.4)
RBC # URNS HPF: ABNORMAL /HPF
RBC UR QL AUTO: NEGATIVE
SODIUM SERPL-SCNC: 138 MMOL/L (ref 135–145)
SP GR UR STRIP.AUTO: 1.02
UROBILINOGEN UR STRIP.AUTO-MCNC: 0.2 MG/DL
WBC # BLD AUTO: 5.3 K/UL (ref 4.8–10.8)
WBC #/AREA URNS HPF: ABNORMAL /HPF

## 2023-02-13 PROCEDURE — 36415 COLL VENOUS BLD VENIPUNCTURE: CPT

## 2023-02-13 PROCEDURE — 81001 URINALYSIS AUTO W/SCOPE: CPT

## 2023-02-13 PROCEDURE — 80053 COMPREHEN METABOLIC PANEL: CPT

## 2023-02-13 PROCEDURE — 85025 COMPLETE CBC W/AUTO DIFF WBC: CPT

## 2023-02-13 PROCEDURE — 84703 CHORIONIC GONADOTROPIN ASSAY: CPT

## 2023-02-13 ASSESSMENT — FIBROSIS 4 INDEX: FIB4 SCORE: 0.99

## 2023-03-03 ENCOUNTER — HOSPITAL ENCOUNTER (OUTPATIENT)
Facility: MEDICAL CENTER | Age: 38
End: 2023-03-03
Attending: OBSTETRICS & GYNECOLOGY | Admitting: OBSTETRICS & GYNECOLOGY
Payer: MEDICAID

## 2023-03-03 ENCOUNTER — ANESTHESIA EVENT (OUTPATIENT)
Dept: SURGERY | Facility: MEDICAL CENTER | Age: 38
End: 2023-03-03
Payer: MEDICAID

## 2023-03-03 ENCOUNTER — ANESTHESIA (OUTPATIENT)
Dept: SURGERY | Facility: MEDICAL CENTER | Age: 38
End: 2023-03-03
Payer: MEDICAID

## 2023-03-03 VITALS
TEMPERATURE: 97.9 F | BODY MASS INDEX: 23.09 KG/M2 | SYSTOLIC BLOOD PRESSURE: 120 MMHG | WEIGHT: 164.9 LBS | HEART RATE: 82 BPM | RESPIRATION RATE: 23 BRPM | HEIGHT: 71 IN | DIASTOLIC BLOOD PRESSURE: 70 MMHG | OXYGEN SATURATION: 96 %

## 2023-03-03 DIAGNOSIS — G89.18 POST-OPERATIVE PAIN: ICD-10-CM

## 2023-03-03 PROBLEM — R51.9 HEADACHE: Status: RESOLVED | Noted: 2022-12-22 | Resolved: 2023-03-03

## 2023-03-03 PROBLEM — Z30.2 ENCOUNTER FOR STERILIZATION: Status: ACTIVE | Noted: 2021-10-26

## 2023-03-03 PROBLEM — R07.9 CHEST PAIN: Status: RESOLVED | Noted: 2021-12-28 | Resolved: 2023-03-03

## 2023-03-03 LAB
HCG UR QL: NEGATIVE
PATHOLOGY CONSULT NOTE: NORMAL

## 2023-03-03 PROCEDURE — 700102 HCHG RX REV CODE 250 W/ 637 OVERRIDE(OP): Performed by: ANESTHESIOLOGY

## 2023-03-03 PROCEDURE — 160046 HCHG PACU - 1ST 60 MINS PHASE II: Performed by: OBSTETRICS & GYNECOLOGY

## 2023-03-03 PROCEDURE — 160048 HCHG OR STATISTICAL LEVEL 1-5: Performed by: OBSTETRICS & GYNECOLOGY

## 2023-03-03 PROCEDURE — 160028 HCHG SURGERY MINUTES - 1ST 30 MINS LEVEL 3: Performed by: OBSTETRICS & GYNECOLOGY

## 2023-03-03 PROCEDURE — 160035 HCHG PACU - 1ST 60 MINS PHASE I: Performed by: OBSTETRICS & GYNECOLOGY

## 2023-03-03 PROCEDURE — 700101 HCHG RX REV CODE 250: Performed by: OBSTETRICS & GYNECOLOGY

## 2023-03-03 PROCEDURE — 160039 HCHG SURGERY MINUTES - EA ADDL 1 MIN LEVEL 3: Performed by: OBSTETRICS & GYNECOLOGY

## 2023-03-03 PROCEDURE — 700111 HCHG RX REV CODE 636 W/ 250 OVERRIDE (IP): Performed by: ANESTHESIOLOGY

## 2023-03-03 PROCEDURE — 160002 HCHG RECOVERY MINUTES (STAT): Performed by: OBSTETRICS & GYNECOLOGY

## 2023-03-03 PROCEDURE — 88302 TISSUE EXAM BY PATHOLOGIST: CPT

## 2023-03-03 PROCEDURE — 110371 HCHG SHELL REV 272: Performed by: OBSTETRICS & GYNECOLOGY

## 2023-03-03 PROCEDURE — 700101 HCHG RX REV CODE 250: Performed by: ANESTHESIOLOGY

## 2023-03-03 PROCEDURE — 00840 ANES IPER PX LOWER ABD NOS: CPT | Performed by: ANESTHESIOLOGY

## 2023-03-03 PROCEDURE — A9270 NON-COVERED ITEM OR SERVICE: HCPCS | Performed by: ANESTHESIOLOGY

## 2023-03-03 PROCEDURE — 160036 HCHG PACU - EA ADDL 30 MINS PHASE I: Performed by: OBSTETRICS & GYNECOLOGY

## 2023-03-03 PROCEDURE — 81025 URINE PREGNANCY TEST: CPT

## 2023-03-03 PROCEDURE — 700105 HCHG RX REV CODE 258: Performed by: OBSTETRICS & GYNECOLOGY

## 2023-03-03 PROCEDURE — 160025 RECOVERY II MINUTES (STATS): Performed by: OBSTETRICS & GYNECOLOGY

## 2023-03-03 PROCEDURE — 160009 HCHG ANES TIME/MIN: Performed by: OBSTETRICS & GYNECOLOGY

## 2023-03-03 RX ORDER — SODIUM CHLORIDE, SODIUM LACTATE, POTASSIUM CHLORIDE, CALCIUM CHLORIDE 600; 310; 30; 20 MG/100ML; MG/100ML; MG/100ML; MG/100ML
INJECTION, SOLUTION INTRAVENOUS CONTINUOUS
Status: ACTIVE | OUTPATIENT
Start: 2023-03-03 | End: 2023-03-03

## 2023-03-03 RX ORDER — CEFAZOLIN SODIUM 1 G/3ML
INJECTION, POWDER, FOR SOLUTION INTRAMUSCULAR; INTRAVENOUS PRN
Status: DISCONTINUED | OUTPATIENT
Start: 2023-03-03 | End: 2023-03-03 | Stop reason: SURG

## 2023-03-03 RX ORDER — HYDROMORPHONE HYDROCHLORIDE 1 MG/ML
0.1 INJECTION, SOLUTION INTRAMUSCULAR; INTRAVENOUS; SUBCUTANEOUS
Status: DISCONTINUED | OUTPATIENT
Start: 2023-03-03 | End: 2023-03-03 | Stop reason: HOSPADM

## 2023-03-03 RX ORDER — DOCUSATE SODIUM 100 MG/1
100 CAPSULE, LIQUID FILLED ORAL 2 TIMES DAILY
Qty: 60 CAPSULE | Refills: 3 | Status: SHIPPED | OUTPATIENT
Start: 2023-03-03 | End: 2023-11-28

## 2023-03-03 RX ORDER — MIDAZOLAM HYDROCHLORIDE 1 MG/ML
1 INJECTION INTRAMUSCULAR; INTRAVENOUS
Status: DISCONTINUED | OUTPATIENT
Start: 2023-03-03 | End: 2023-03-03 | Stop reason: HOSPADM

## 2023-03-03 RX ORDER — KETOROLAC TROMETHAMINE 30 MG/ML
INJECTION, SOLUTION INTRAMUSCULAR; INTRAVENOUS PRN
Status: DISCONTINUED | OUTPATIENT
Start: 2023-03-03 | End: 2023-03-03 | Stop reason: SURG

## 2023-03-03 RX ORDER — BUPIVACAINE HYDROCHLORIDE 2.5 MG/ML
INJECTION, SOLUTION EPIDURAL; INFILTRATION; INTRACAUDAL
Status: DISCONTINUED
Start: 2023-03-03 | End: 2023-03-03 | Stop reason: HOSPADM

## 2023-03-03 RX ORDER — ONDANSETRON 2 MG/ML
INJECTION INTRAMUSCULAR; INTRAVENOUS PRN
Status: DISCONTINUED | OUTPATIENT
Start: 2023-03-03 | End: 2023-03-03 | Stop reason: SURG

## 2023-03-03 RX ORDER — MEPERIDINE HYDROCHLORIDE 25 MG/ML
25 INJECTION INTRAMUSCULAR; INTRAVENOUS; SUBCUTANEOUS
Status: DISCONTINUED | OUTPATIENT
Start: 2023-03-03 | End: 2023-03-03 | Stop reason: HOSPADM

## 2023-03-03 RX ORDER — OXYCODONE HCL 5 MG/5 ML
5 SOLUTION, ORAL ORAL
Status: COMPLETED | OUTPATIENT
Start: 2023-03-03 | End: 2023-03-03

## 2023-03-03 RX ORDER — DEXAMETHASONE SODIUM PHOSPHATE 4 MG/ML
INJECTION, SOLUTION INTRA-ARTICULAR; INTRALESIONAL; INTRAMUSCULAR; INTRAVENOUS; SOFT TISSUE PRN
Status: DISCONTINUED | OUTPATIENT
Start: 2023-03-03 | End: 2023-03-03 | Stop reason: SURG

## 2023-03-03 RX ORDER — HYDROMORPHONE HYDROCHLORIDE 1 MG/ML
0.5 INJECTION, SOLUTION INTRAMUSCULAR; INTRAVENOUS; SUBCUTANEOUS
Status: DISCONTINUED | OUTPATIENT
Start: 2023-03-03 | End: 2023-03-03 | Stop reason: HOSPADM

## 2023-03-03 RX ORDER — IPRATROPIUM BROMIDE AND ALBUTEROL SULFATE 2.5; .5 MG/3ML; MG/3ML
3 SOLUTION RESPIRATORY (INHALATION)
Status: DISCONTINUED | OUTPATIENT
Start: 2023-03-03 | End: 2023-03-03 | Stop reason: HOSPADM

## 2023-03-03 RX ORDER — HYDROMORPHONE HYDROCHLORIDE 1 MG/ML
0.2 INJECTION, SOLUTION INTRAMUSCULAR; INTRAVENOUS; SUBCUTANEOUS
Status: DISCONTINUED | OUTPATIENT
Start: 2023-03-03 | End: 2023-03-03 | Stop reason: HOSPADM

## 2023-03-03 RX ORDER — LIDOCAINE HYDROCHLORIDE 20 MG/ML
INJECTION, SOLUTION EPIDURAL; INFILTRATION; INTRACAUDAL; PERINEURAL PRN
Status: DISCONTINUED | OUTPATIENT
Start: 2023-03-03 | End: 2023-03-03 | Stop reason: SURG

## 2023-03-03 RX ORDER — BUPIVACAINE HYDROCHLORIDE AND EPINEPHRINE 2.5; 5 MG/ML; UG/ML
INJECTION, SOLUTION EPIDURAL; INFILTRATION; INTRACAUDAL; PERINEURAL
Status: DISCONTINUED | OUTPATIENT
Start: 2023-03-03 | End: 2023-03-03 | Stop reason: HOSPADM

## 2023-03-03 RX ORDER — IBUPROFEN 600 MG/1
600 TABLET ORAL EVERY 6 HOURS PRN
Qty: 30 TABLET | Refills: 3 | Status: SHIPPED | OUTPATIENT
Start: 2023-03-03 | End: 2023-11-28

## 2023-03-03 RX ORDER — OXYCODONE HCL 5 MG/5 ML
10 SOLUTION, ORAL ORAL
Status: COMPLETED | OUTPATIENT
Start: 2023-03-03 | End: 2023-03-03

## 2023-03-03 RX ORDER — SODIUM CHLORIDE, SODIUM LACTATE, POTASSIUM CHLORIDE, CALCIUM CHLORIDE 600; 310; 30; 20 MG/100ML; MG/100ML; MG/100ML; MG/100ML
INJECTION, SOLUTION INTRAVENOUS CONTINUOUS
Status: DISCONTINUED | OUTPATIENT
Start: 2023-03-03 | End: 2023-03-03 | Stop reason: HOSPADM

## 2023-03-03 RX ORDER — ONDANSETRON 2 MG/ML
4 INJECTION INTRAMUSCULAR; INTRAVENOUS ONCE
Status: DISCONTINUED | OUTPATIENT
Start: 2023-03-03 | End: 2023-03-03 | Stop reason: HOSPADM

## 2023-03-03 RX ORDER — DIPHENHYDRAMINE HYDROCHLORIDE 50 MG/ML
12.5 INJECTION INTRAMUSCULAR; INTRAVENOUS
Status: DISCONTINUED | OUTPATIENT
Start: 2023-03-03 | End: 2023-03-03 | Stop reason: HOSPADM

## 2023-03-03 RX ADMIN — FENTANYL CITRATE 25 MCG: 50 INJECTION, SOLUTION INTRAMUSCULAR; INTRAVENOUS at 11:39

## 2023-03-03 RX ADMIN — KETOROLAC TROMETHAMINE 30 MG: 30 INJECTION, SOLUTION INTRAMUSCULAR at 10:37

## 2023-03-03 RX ADMIN — SODIUM CHLORIDE, POTASSIUM CHLORIDE, SODIUM LACTATE AND CALCIUM CHLORIDE: 600; 310; 30; 20 INJECTION, SOLUTION INTRAVENOUS at 10:08

## 2023-03-03 RX ADMIN — MEPERIDINE HYDROCHLORIDE 12.5 MG: 25 INJECTION INTRAMUSCULAR; INTRAVENOUS; SUBCUTANEOUS at 11:22

## 2023-03-03 RX ADMIN — OXYCODONE HYDROCHLORIDE 10 MG: 5 SOLUTION ORAL at 11:33

## 2023-03-03 RX ADMIN — FENTANYL CITRATE 50 MCG: 50 INJECTION, SOLUTION INTRAMUSCULAR; INTRAVENOUS at 10:48

## 2023-03-03 RX ADMIN — PROPOFOL 150 MG: 10 INJECTION, EMULSION INTRAVENOUS at 10:13

## 2023-03-03 RX ADMIN — MIDAZOLAM 2 MG: 1 INJECTION INTRAMUSCULAR; INTRAVENOUS at 10:08

## 2023-03-03 RX ADMIN — DEXAMETHASONE SODIUM PHOSPHATE 8 MG: 4 INJECTION, SOLUTION INTRA-ARTICULAR; INTRALESIONAL; INTRAMUSCULAR; INTRAVENOUS; SOFT TISSUE at 10:36

## 2023-03-03 RX ADMIN — CEFAZOLIN 1 G: 330 INJECTION, POWDER, FOR SOLUTION INTRAMUSCULAR; INTRAVENOUS at 10:08

## 2023-03-03 RX ADMIN — ONDANSETRON 4 MG: 2 INJECTION INTRAMUSCULAR; INTRAVENOUS at 10:36

## 2023-03-03 RX ADMIN — FENTANYL CITRATE 50 MCG: 50 INJECTION, SOLUTION INTRAMUSCULAR; INTRAVENOUS at 11:51

## 2023-03-03 RX ADMIN — SUGAMMADEX 150 MG: 100 INJECTION, SOLUTION INTRAVENOUS at 11:06

## 2023-03-03 RX ADMIN — LIDOCAINE HYDROCHLORIDE 40 MG: 20 INJECTION, SOLUTION EPIDURAL; INFILTRATION; INTRACAUDAL at 10:13

## 2023-03-03 RX ADMIN — ROCURONIUM BROMIDE 40 MG: 10 INJECTION, SOLUTION INTRAVENOUS at 10:13

## 2023-03-03 RX ADMIN — FENTANYL CITRATE 25 MCG: 50 INJECTION, SOLUTION INTRAMUSCULAR; INTRAVENOUS at 11:35

## 2023-03-03 RX ADMIN — FENTANYL CITRATE 50 MCG: 50 INJECTION, SOLUTION INTRAMUSCULAR; INTRAVENOUS at 10:13

## 2023-03-03 ASSESSMENT — PAIN SCALES - GENERAL: PAIN_LEVEL: 2

## 2023-03-03 ASSESSMENT — PAIN DESCRIPTION - PAIN TYPE
TYPE: SURGICAL PAIN

## 2023-03-03 ASSESSMENT — FIBROSIS 4 INDEX: FIB4 SCORE: 0.83

## 2023-03-03 NOTE — OR SURGEON
Immediate Post OP Note      PRE-OP DIAGNOSIS:          1. COMPLETED FAMILY SIZE          2. DESIRES PERMANENT STERILIZATION     PostOp Diagnosis: same      Procedure(s):  LAPAROSCOPIC BILATERAL SALPINGECTOMY - Wound Class: Clean    Surgeon(s):  Oneyda Donnelly M.D.    Anesthesiologist/Type of Anesthesia:  Anesthesiologist: Fady Wheeler M.D./General    Surgical Staff:  Circulator: Magali Hoyos R.N.  Scrub Person: Melissa Chase    Specimens removed if any:  ID Type Source Tests Collected by Time Destination   A : bilateral fallopian tube Tissue Fallopian Tube PATHOLOGY SPECIMEN Oneyda Donnelly M.D. 3/3/2023 10:48 AM        Estimated Blood Loss: minimal    Findings: uterus, bilateral tubes and ovaries grossly normal                  RO cyst- hemorrhagic 2 cm    Complications: none    3/3/2023 11:12 AM Oneyda Donnelly M.D.

## 2023-03-03 NOTE — ANESTHESIA PROCEDURE NOTES
Airway    Date/Time: 3/3/2023 10:14 AM  Performed by: Fady Wheeler M.D.  Authorized by: Fady Wheeler M.D.     Location:  OR  Urgency:  Elective  Indications for Airway Management:  Anesthesia      Spontaneous Ventilation: absent    Sedation Level:  Deep  Preoxygenated: Yes    Patient Position:  Sniffing  Final Airway Type:  Endotracheal airway  Final Endotracheal Airway:  ETT  Cuffed: Yes    Technique Used for Successful ETT Placement:  Direct laryngoscopy    Insertion Site:  Oral  Blade Type:  Rip  Laryngoscope Blade/Videolaryngoscope Blade Size:  3  ETT Size (mm):  6.5  Measured from:  Teeth  ETT to Teeth (cm):  21  Placement Verified by: auscultation and capnometry    Cormack-Lehane Classification:  Grade I - full view of glottis  Number of Attempts at Approach:  1

## 2023-03-03 NOTE — ANESTHESIA POSTPROCEDURE EVALUATION
Patient: Magui Mallory    Procedure Summary     Date: 03/03/23 Room / Location: [facility] ROOM 25 / SURGERY SAME DAY [facility]    Anesthesia Start: 1008 Anesthesia Stop: 1120    Procedure: LAPAROSCOPIC BILATERAL SALPINGECTOMY (Abdomen) Diagnosis: (ENCOUNTER FOR STERILIZATION)    Surgeons: Oneyda Donnelly M.D. Responsible Provider: Fady Wheeler M.D.    Anesthesia Type: general ASA Status: 2          Final Anesthesia Type: general  Last vitals  BP   Blood Pressure: 120/70    Temp   36.6 °C (97.9 °F)    Pulse   82   Resp   (!) 23    SpO2   96 %      Anesthesia Post Evaluation    Patient location during evaluation: PACU  Patient participation: complete - patient participated  Level of consciousness: awake and alert  Pain score: 2    Airway patency: patent  Anesthetic complications: no  Cardiovascular status: hemodynamically stable  Respiratory status: acceptable  Hydration status: euvolemic    PONV: none          There were no known notable events for this encounter.     Nurse Pain Score: 5 (NPRS)

## 2023-03-03 NOTE — OP REPORT
OPERATIVE REPORT        DATE OF SERVICE:  3/3/2023     PREOPERATIVE DIAGNOSES:  Completed Family size, Desires permanent sterilization.     POSTOPERATIVE DIAGNOSES:  same     PROCEDURE DONE:  Laparoscopic Bilateral Salpingectomy     SURGEON:  Oneyda Rinaldi MD     ANESTHESIOLOGIST:  Fady Wheeler M.D.     ANESTHESIA:  General anesthesia.     SPECIMEN:  Bilateral fallopian tubes.     ESTIMATED BLOOD LOSS:  minimal     FINDINGS:  Uterus, bilateral tubes and ovaries were grossly normal.                       RO hemorrhagic cyst     COMPLICATIONS:  None.     PATIENT CONDITION: stable.     DESCRIPTION OF THE PROCEDURE:  Patient and family were discussed about the   risks, indications, benefits, alternatives of the procedure and they were   agreeable to proceed and signed consents.     Patient was then taken to the operating room where she was placed in supine   position.  Patient was then given general anesthesia without difficulty.  A   formal timeout was done.  Patient was then placed in a lithotomy position,   prepped and draped in the normal usual sterile fashion.  The bladder was   emptied using straight catheter draining clear urine.  A sponge stick was   placed into the vagina for manipulation.     Legs were deflexed and attention was turned to the abdomen for the laroscopy.     A 5 mm incision was done on the umbilical fold after infiltration with   Marcaine.  A 5 mm incision was done and a laparoscope was inserted using   Optiview entry into the abdominal cavity under direct visualization.    Pneumo-insufflation with CO2 gas was then achieved with 14 mmHg and 2.5 liters   of CO2 gas.  Pelvic cavity as described above.  Two 5 mm ports were then   placed under direct visualization at 2 finger breadths anterior superior to the anterior superior  iliac spines.  Starting with the right tube, the fimbrial end was grasped and  LigaSure cautery was then used to grasp the mesosalpinx and the right  tube   was excised using the LigaSure with excellent hemostasis. Same procedure was done on the contralateral side with   excellent hemostasis achieved. Bilateral tubes sent to pathology  At the end of the procedure, there was no bleeding noted.     Instruments were all removed.  Pneumoperitoneum was desufflated and skin was   closed using Vicryl 4-0.  All instruments, needles and sponges were correct   x2.  Patient tolerated the procedure well.  There were no intraoperative   complications noted.  Patient was sent to the PACU awake and in good   condition.        ____________________________________     Oneyda Rinaldi MD

## 2023-03-03 NOTE — H&P
HISTORY AND PHYSICAL     PRE-OP DIAGNOSIS:          1. COMPLETED FAMILY SIZE          2. DESIRES PERMANENT STERILIZATION     PROPOSED SURGICAL PROCEDURE:          1. LAPAROSCOPIC BILATERAL SALPINGECTOMY    HPI:     Magui is a 37 year old  last delivery was  via  comes for sterilization.  no pelvic pains  sexually active no dyspareunia    Review of systems:  Pertinent positives documented in HPI and all other systems reviewed & are negative    All PMH, PSH, allergies, social history and FH reviewed and updated today:  Past Medical History:   Diagnosis Date    Arthritis     osteo, hands,wrist,hips,knees    Herpes     Pain     hips, sacrum back, shoulders, knees    Psychiatric problem     anxiety       Past Surgical History:   Procedure Laterality Date    MENISCUS REPAIR         Allergies:   Allergies   Allergen Reactions    Codeine Hives and Itching       Social History     Socioeconomic History    Marital status: Single     Spouse name: Not on file    Number of children: Not on file    Years of education: Not on file    Highest education level: Not on file   Occupational History    Not on file   Tobacco Use    Smoking status: Former     Packs/day: 0.25     Years: 20.00     Pack years: 5.00     Types: Cigarettes     Start date: 1999     Quit date: 2022     Years since quittin.1    Smokeless tobacco: Never   Vaping Use    Vaping Use: Never used   Substance and Sexual Activity    Alcohol use: Yes     Comment: 2/month    Drug use: Yes     Types: Marijuana, Inhaled     Comment: marijuana    Sexual activity: Not Currently     Birth control/protection: Abstinence   Other Topics Concern    Not on file   Social History Narrative    Not on file     Social Determinants of Health     Financial Resource Strain: Not on file   Food Insecurity: Not on file   Transportation Needs: Not on file   Physical Activity: Not on file   Stress: Not on file   Social Connections: Not on file   Intimate Partner  "Violence: Not on file   Housing Stability: Not on file       No family history on file.    Physical exam:  Ht 1.803 m (5' 11\")   Wt 73.3 kg (161 lb 9.6 oz)     General:appears stated age, is in no apparent distress, is well developed and well nourished  Head: normocephalic, non-tender  Neck: neck is supple  CV : regular rate and rhythm, no peripheral edema  Lungs: Normal respiratory effort. Clear to auscultation bilaterally  Abdomen: Bowel sounds positive, nondistended, soft, nontender x4, no rebound or guarding. No organomegaly. No masses.  Female GYN: deferred  Skin: No rashes, or ulcers or lesions seen  Psychiatric: Patient shows appropriate affect, is alert and oriented x3, intact judgment and insight.      ASSESSMENT AND PLAN:      1. COMPLETED FAMILY SIZED          2. DESIRES PERMANENT STERILIZATION     LAPAROSCOPIC BILATERAL SALPINGECTOMY ANY OTHER INDICATED PROCEDURE.    Discussed today with a risks of laparoscopic bilateral salpingectomy.  I discussed that is considered a permanent procedure and the patient will no longer be able to bear children following a bilateral salpingectomy.  I discussed other forms of birth control which include pills, barrier methods, Depo-Provera, IUD or male sterilization. We discussed medications or procedures are nonpermanent nor are they surgical. I also discussed the risk of failure with the patient which is one in 100 procedures. We discussed that should the tubal fail there is a risk for ectopic pregnancy which may require surgical intervention.  Removal of the entire fallopian tube (bilateral salpingectomy) was discussed with her. She is made aware that bilateral salpingectomy is a method of permanent sterilization wherein the entire fallopian tubes are excised, evidence shows that ovarian cancer may frequently originate from the fallopian tubes thus risks of such are decreased.  Risks and benefits of laparoscopy are discussed with patient today. Specific risk for a " laparoscopy are infection, bleeding, scar to the exterior or interior of abdomen, damage to other structures including bowel, bladder or ureters. Failure of laparoscopy to diagnose and or correct patient's problem.  After discussion of risks and benefits, patient had opportunity to ask questions pertaining to indications/risks benefits of surgery. All questions have been answered to stated satisfaction

## 2023-03-03 NOTE — DISCHARGE INSTRUCTIONS
If any questions arise, call your provider.  If your provider is not available, please feel free to call the Surgical Center at [phone].    MEDICATIONS: Resume taking daily medication.  Take prescribed pain medication with food.  If no medication is prescribed, you may take non-aspirin pain medication if needed.  PAIN MEDICATION CAN BE VERY CONSTIPATING.  Take a stool softener or laxative such as senokot, pericolace, or milk of magnesia if needed.    Last pain medication given Oxycodone given at 11:33am    What to Expect Post Anesthesia    Rest and take it easy for the first 24 hours.  A responsible adult is recommended to remain with you during that time.  It is normal to feel sleepy.  We encourage you to not do anything that requires balance, judgment or coordination.    FOR 24 HOURS DO NOT:  Drive, operate machinery or run household appliances.  Drink beer or alcoholic beverages.  Make important decisions or sign legal documents.    To avoid nausea, slowly advance diet as tolerated, avoiding spicy or greasy foods for the first day.  Add more substantial food to your diet according to your provider's instructions.  Babies can be fed formula or breast milk as soon as they are hungry.  INCREASE FLUIDS AND FIBER TO AVOID CONSTIPATION.    MILD FLU-LIKE SYMPTOMS ARE NORMAL.  YOU MAY EXPERIENCE GENERALIZED MUSCLE ACHES, THROAT IRRITATION, HEADACHE AND/OR SOME NAUSEA.

## 2023-03-03 NOTE — OR NURSING
1113 Patient arrived to PACU. Report received from anesthesia and OR RN. Patient on 4L of oxygen via mask. Placed on monitor. Patients surgical site CDI, dermabond in place . Patient awake.     1122 Demerol provided for shaking. Patients sister updated on recovery.     1133 Oxycodone given.    1151 Subsequent dose of pain medication given    1210 Patient states pain tolerable. Denies nausea.    1228 Patient discharged with sister. Discharge education provided and questions answered. Educated on medications sent to pharmacy. PIV removed. All belongings gathered and sent with patient.

## 2023-03-03 NOTE — ANESTHESIA TIME REPORT
Anesthesia Start and Stop Event Times     Date Time Event    3/3/2023 0838 Ready for Procedure     1008 Anesthesia Start     1120 Anesthesia Stop        Responsible Staff  03/03/23    Name Role Begin End    Fady Wheeler M.D. Anesth 1008 1120        Overtime Reason:  no overtime (within assigned shift)    Comments:

## 2023-03-03 NOTE — ANESTHESIA PREPROCEDURE EVALUATION
Case: [id number] Date/Time: 03/03/23 0915    Procedure: LAPAROSCOPIC BILATERAL SALPINGECTOMY (Abdomen)    Anesthesia type: General    Pre-op diagnosis: ENCOUNTER FOR STERILIZATION    Location: [facility] ROOM 25 / SURGERY SAME DAY [facility]    Surgeons: Oneyda Donnelly M.D.          Relevant Problems   NEURO   (positive) Headache   (positive) Migraine      CARDIAC   (positive) Migraine         (positive) Steatosis of liver       Physical Exam    Airway   Mallampati: II  TM distance: >3 FB  Neck ROM: full       Cardiovascular - normal exam  Rhythm: regular  Rate: normal  (-) murmur     Dental - normal exam           Pulmonary - normal exam  Breath sounds clear to auscultation     Abdominal    Neurological - normal exam                 Anesthesia Plan    ASA 2       Plan - general       Airway plan will be ETT          Induction: intravenous    Postoperative Plan: Postoperative administration of opioids is intended.    Pertinent diagnostic labs and testing reviewed    Informed Consent:    Anesthetic plan and risks discussed with patient.    Use of blood products discussed with: patient whom consented to blood products.

## 2023-04-25 ENCOUNTER — OFFICE VISIT (OUTPATIENT)
Dept: MEDICAL GROUP | Facility: CLINIC | Age: 38
End: 2023-04-25
Payer: MEDICAID

## 2023-04-25 VITALS
TEMPERATURE: 97.6 F | HEART RATE: 87 BPM | OXYGEN SATURATION: 96 % | HEIGHT: 71 IN | SYSTOLIC BLOOD PRESSURE: 114 MMHG | WEIGHT: 162 LBS | DIASTOLIC BLOOD PRESSURE: 76 MMHG | BODY MASS INDEX: 22.68 KG/M2

## 2023-04-25 DIAGNOSIS — F41.0 PANIC DISORDER: ICD-10-CM

## 2023-04-25 DIAGNOSIS — Z00.00 PREVENTATIVE HEALTH CARE: ICD-10-CM

## 2023-04-25 DIAGNOSIS — B00.9 HERPESVIRUS INFECTION: ICD-10-CM

## 2023-04-25 DIAGNOSIS — F41.9 ANXIETY: ICD-10-CM

## 2023-04-25 DIAGNOSIS — G43.709 CHRONIC MIGRAINE WITHOUT AURA WITHOUT STATUS MIGRAINOSUS, NOT INTRACTABLE: ICD-10-CM

## 2023-04-25 PROCEDURE — 99214 OFFICE O/P EST MOD 30 MIN: CPT | Mod: GC

## 2023-04-25 RX ORDER — VALACYCLOVIR HYDROCHLORIDE 500 MG/1
500 TABLET, FILM COATED ORAL 2 TIMES DAILY
Qty: 90 TABLET | Refills: 0 | Status: SHIPPED | OUTPATIENT
Start: 2023-04-25 | End: 2023-05-23

## 2023-04-25 RX ORDER — CLONAZEPAM 0.5 MG/1
TABLET ORAL
COMMUNITY
End: 2023-04-25 | Stop reason: SDUPTHER

## 2023-04-25 RX ORDER — SUMATRIPTAN 25 MG/1
25-100 TABLET, FILM COATED ORAL
COMMUNITY
End: 2023-04-25 | Stop reason: SDUPTHER

## 2023-04-25 RX ORDER — CLONAZEPAM 0.5 MG/1
0.5 TABLET ORAL EVERY 6 HOURS PRN
Qty: 30 TABLET | Refills: 1 | Status: SHIPPED | OUTPATIENT
Start: 2023-04-25 | End: 2023-04-25

## 2023-04-25 RX ORDER — SUMATRIPTAN 25 MG/1
25-100 TABLET, FILM COATED ORAL
Qty: 10 TABLET | Refills: 1 | Status: SHIPPED | OUTPATIENT
Start: 2023-04-25 | End: 2023-06-02

## 2023-04-25 RX ORDER — CLONAZEPAM 0.5 MG/1
0.5 TABLET ORAL
Qty: 30 TABLET | Refills: 0 | Status: SHIPPED | OUTPATIENT
Start: 2023-04-25 | End: 2023-06-08

## 2023-04-25 ASSESSMENT — PATIENT HEALTH QUESTIONNAIRE - PHQ9: CLINICAL INTERPRETATION OF PHQ2 SCORE: 0

## 2023-04-25 ASSESSMENT — FIBROSIS 4 INDEX: FIB4 SCORE: 0.83

## 2023-04-25 NOTE — ASSESSMENT & PLAN NOTE
Well-controlled, occasionally transitions to panic attacks for which she uses Klonopin and has been effective.    Plan:  -Referral to psychiatry per patient request

## 2023-04-25 NOTE — ASSESSMENT & PLAN NOTE
Chronic, not associated with aura, responsive to sumatriptan as abortive therapy.    Plan:  -Refill sumatriptan  mg

## 2023-04-25 NOTE — ASSESSMENT & PLAN NOTE
Occasional flareups throughout the year, valacyclovir continues to be effective.  Patient needs a refill.    Plan:  -Refill valacyclovir 500 mg twice daily

## 2023-04-25 NOTE — ASSESSMENT & PLAN NOTE
Chronic, recurring monthly, effectively managed with Klonopin 0.5 mg.  Patient would like referral to psychiatry for ongoing management.    Plan:  -Referral to psychiatry  -Refill Klonopin 0.5 mg

## 2023-04-25 NOTE — PROGRESS NOTES
Subjective:     CC: Med Refill     HPI:   Magui presents today with request for medication refill.  She would like to have her sumatriptan, Klonopin and valacyclovir refilled.  Patient reports using sumatriptan on average 2-3 times per month, Klonopin on average 3 times but up to 10 times per month and valacyclovir only occasionally throughout the year.  All the medications mentioned above have been effective for her and has no concerns regarding dosage changes.  Overall, patient feels she is in good health and has no acute healthcare concerns at this time.  She would however, like to request a referral to psychiatry to have ongoing management of her Klonopin.  This is something she had discussed with primary care at previous visit however, referral was made to psychology and patient needs a psychiatry referral.  Preventative care: Patient agreeable to have hep C, HIV labs.  Reportedly up-to-date on Tdap but has never had COVID immunizations and continues to decline at this time.  Last Pap smear was approximately 2 years ago and was normal.      Current Outpatient Medications Ordered in Epic   Medication Sig Dispense Refill    SUMAtriptan (IMITREX) 25 MG Tab tablet Take 1-4 Tablets by mouth one time as needed for Migraine. 10 Tablet 1    valACYclovir (VALTREX) 500 MG Tab Take 1 Tablet by mouth 2 times a day. 90 Tablet 0    clonazePAM (KLONOPIN) 0.5 MG Tab Take 1 Tablet by mouth 1 time a day as needed (Panic attacks) for up to 30 days. 30 Tablet 0    ibuprofen (MOTRIN) 600 MG Tab Take 1 Tablet by mouth every 6 hours as needed for Moderate Pain. 30 Tablet 3    MELATONIN PO Take 1 Capsule by mouth every day.      Multiple Vitamin (MULTIVITAMIN ADULT PO) Take 1 Tablet by mouth every day.      celecoxib (CELEBREX) 100 MG Cap       morphine ER (MS CONTIN) 15 MG Tab CR tablet       methocarbamol (ROBAXIN) 750 MG Tab       docusate sodium (COLACE) 100 MG Cap Take 1 Capsule by mouth 2 times a day. (Patient not taking:  "Reported on 4/25/2023) 60 Capsule 3    Esomeprazole Magnesium 10 MG Pack Take  by mouth. (Patient not taking: Reported on 4/25/2023)       No current [facility]-ordered facility-administered medications on file.       ROS:  Gen: no fevers/chills, no changes in weight  Pulm: no sob, no cough  CV: no chest pain, no palpitations  GI: no nausea/vomiting, no diarrhea    Objective:     Exam:  /76 (BP Location: Right arm, Patient Position: Sitting)   Pulse 87   Temp 36.4 °C (97.6 °F) (Temporal)   Ht 1.803 m (5' 11\")   Wt 73.5 kg (162 lb)   SpO2 96%   BMI 22.59 kg/m²  Body mass index is 22.59 kg/m².    Gen: Alert and oriented, No apparent distress.  Neck: Neck is supple without lymphadenopathy.  Lungs: Normal effort, CTA bilaterally, no wheezes, rhonchi, or rales  CV: Regular rate and rhythm. No murmurs, rubs, or gallops.  Ext: No clubbing, cyanosis, edema.    Assessment & Plan:     37 y.o. female with the following -     Problem List Items Addressed This Visit       Anxiety     Well-controlled, occasionally transitions to panic attacks for which she uses Klonopin and has been effective.    Plan:  -Referral to psychiatry per patient request         Relevant Medications    clonazePAM (KLONOPIN) 0.5 MG Tab    Other Relevant Orders    Referral to Psychiatry    Herpesvirus infection     Occasional flareups throughout the year, valacyclovir continues to be effective.  Patient needs a refill.    Plan:  -Refill valacyclovir 500 mg twice daily         Relevant Medications    valACYclovir (VALTREX) 500 MG Tab    Migraine     Chronic, not associated with aura, responsive to sumatriptan as abortive therapy.    Plan:  -Refill sumatriptan  mg         Relevant Medications    SUMAtriptan (IMITREX) 25 MG Tab tablet    clonazePAM (KLONOPIN) 0.5 MG Tab    Panic disorder     Chronic, recurring monthly, effectively managed with Klonopin 0.5 mg.  Patient would like referral to psychiatry for ongoing management.    Plan:  -Referral " to psychiatry  -Refill Klonopin 0.5 mg         Relevant Medications    clonazePAM (KLONOPIN) 0.5 MG Tab    Other Relevant Orders    Referral to Psychiatry     Other Visit Diagnoses       Preventative health care        Relevant Orders    HEP C VIRUS ANTIBODY    HIV AG/AB COMBO ASSAY SCREENING           Return in about 3 months (around 7/25/2023).    Tony Umaña M.D.  PGY1

## 2023-05-22 DIAGNOSIS — B00.9 HERPESVIRUS INFECTION: ICD-10-CM

## 2023-05-23 RX ORDER — VALACYCLOVIR HYDROCHLORIDE 500 MG/1
TABLET, FILM COATED ORAL
Qty: 60 TABLET | Refills: 1 | Status: SHIPPED | OUTPATIENT
Start: 2023-05-23 | End: 2023-07-21

## 2023-06-08 DIAGNOSIS — F41.9 ANXIETY: ICD-10-CM

## 2023-06-08 DIAGNOSIS — F41.0 PANIC DISORDER: ICD-10-CM

## 2023-06-08 RX ORDER — CLONAZEPAM 0.5 MG/1
0.5 TABLET ORAL
Qty: 30 TABLET | Refills: 0 | Status: SHIPPED | OUTPATIENT
Start: 2023-06-08 | End: 2023-07-21

## 2023-07-20 DIAGNOSIS — B00.9 HERPESVIRUS INFECTION: ICD-10-CM

## 2023-07-20 DIAGNOSIS — F41.0 PANIC DISORDER: ICD-10-CM

## 2023-07-20 DIAGNOSIS — F41.9 ANXIETY: ICD-10-CM

## 2023-07-21 RX ORDER — VALACYCLOVIR HYDROCHLORIDE 500 MG/1
TABLET, FILM COATED ORAL
Qty: 60 TABLET | Refills: 1 | Status: SHIPPED | OUTPATIENT
Start: 2023-07-21 | End: 2023-09-18

## 2023-07-21 RX ORDER — CLONAZEPAM 0.5 MG/1
0.5 TABLET ORAL
Qty: 30 TABLET | Refills: 0 | Status: SHIPPED | OUTPATIENT
Start: 2023-07-21 | End: 2023-08-20

## 2023-09-18 DIAGNOSIS — B00.9 HERPESVIRUS INFECTION: ICD-10-CM

## 2023-09-18 RX ORDER — VALACYCLOVIR HYDROCHLORIDE 500 MG/1
TABLET, FILM COATED ORAL
Qty: 60 TABLET | Refills: 1 | Status: SHIPPED | OUTPATIENT
Start: 2023-09-18

## 2023-10-03 ENCOUNTER — APPOINTMENT (OUTPATIENT)
Dept: MEDICAL GROUP | Facility: CLINIC | Age: 38
End: 2023-10-03
Payer: MEDICAID

## 2023-10-13 ENCOUNTER — TELEMEDICINE (OUTPATIENT)
Dept: MEDICAL GROUP | Facility: CLINIC | Age: 38
End: 2023-10-13
Payer: MEDICAID

## 2023-10-13 DIAGNOSIS — F41.0 PANIC DISORDER: ICD-10-CM

## 2023-10-13 DIAGNOSIS — F41.9 ANXIETY: ICD-10-CM

## 2023-10-13 PROCEDURE — 99213 OFFICE O/P EST LOW 20 MIN: CPT | Mod: GT,GC

## 2023-10-13 RX ORDER — CLONAZEPAM 0.5 MG/1
TABLET ORAL
COMMUNITY
End: 2023-10-13 | Stop reason: SDUPTHER

## 2023-10-13 RX ORDER — CLONAZEPAM 0.5 MG/1
TABLET ORAL
Qty: 30 TABLET | Refills: 2 | Status: SHIPPED | OUTPATIENT
Start: 2023-10-13 | End: 2023-11-12

## 2023-10-13 NOTE — ASSESSMENT & PLAN NOTE
Patient reports random panic attacks that she has had since she was younger.  She states she has good coping skills but uses clonazepam to help with the symptoms.  She does not take it daily and the most she takes is twice in 1 day if she has really bad symptoms.  Will refill clonazepam for 30 days with 2 refills following patient signing controlled substance agreement.  She plans to come into the clinic to sign it today.  -Referred to psychiatry to better manage panic disorder and develop better coping skills to not have to chronically take clonazepam.

## 2023-10-13 NOTE — ASSESSMENT & PLAN NOTE
Will refill clonazepam for 30 days with 2 refills following patient signing controlled substance agreement.  She plans to come into the clinic to sign it today.  -Referred to psychiatry to better manage panic disorder and develop better coping skills to not have to chronically take clonazepam.

## 2023-10-21 DIAGNOSIS — G43.709 CHRONIC MIGRAINE WITHOUT AURA WITHOUT STATUS MIGRAINOSUS, NOT INTRACTABLE: ICD-10-CM

## 2023-10-26 RX ORDER — SUMATRIPTAN 25 MG/1
25-100 TABLET, FILM COATED ORAL
Qty: 9 TABLET | Refills: 2 | Status: SHIPPED | OUTPATIENT
Start: 2023-10-26 | End: 2024-01-22

## 2023-11-13 ENCOUNTER — APPOINTMENT (OUTPATIENT)
Dept: MEDICAL GROUP | Facility: CLINIC | Age: 38
End: 2023-11-13
Payer: MEDICAID

## 2023-11-27 ENCOUNTER — OFFICE VISIT (OUTPATIENT)
Dept: MEDICAL GROUP | Facility: CLINIC | Age: 38
End: 2023-11-27
Payer: MEDICAID

## 2023-11-27 VITALS
TEMPERATURE: 99 F | HEART RATE: 93 BPM | OXYGEN SATURATION: 98 % | DIASTOLIC BLOOD PRESSURE: 80 MMHG | SYSTOLIC BLOOD PRESSURE: 118 MMHG | WEIGHT: 176 LBS | HEIGHT: 70 IN | BODY MASS INDEX: 25.2 KG/M2

## 2023-11-27 DIAGNOSIS — R63.5 WEIGHT GAIN: ICD-10-CM

## 2023-11-27 DIAGNOSIS — R51.9 CHRONIC NONINTRACTABLE HEADACHE, UNSPECIFIED HEADACHE TYPE: ICD-10-CM

## 2023-11-27 DIAGNOSIS — M25.50 POLYARTHRALGIA: ICD-10-CM

## 2023-11-27 DIAGNOSIS — Z13.9 SCREENING DUE: ICD-10-CM

## 2023-11-27 DIAGNOSIS — R53.83 OTHER FATIGUE: ICD-10-CM

## 2023-11-27 DIAGNOSIS — G89.29 CHRONIC NONINTRACTABLE HEADACHE, UNSPECIFIED HEADACHE TYPE: ICD-10-CM

## 2023-11-27 PROCEDURE — 3074F SYST BP LT 130 MM HG: CPT

## 2023-11-27 PROCEDURE — 3079F DIAST BP 80-89 MM HG: CPT

## 2023-11-27 PROCEDURE — 99214 OFFICE O/P EST MOD 30 MIN: CPT | Mod: GC

## 2023-11-27 RX ORDER — CLONAZEPAM 0.5 MG/1
TABLET ORAL
COMMUNITY
Start: 2023-11-14 | End: 2024-01-16

## 2023-11-27 RX ORDER — DOXYCYCLINE 100 MG/1
TABLET ORAL
COMMUNITY
End: 2023-11-28

## 2023-11-27 RX ORDER — METOCLOPRAMIDE 10 MG/1
1 TABLET ORAL 4 TIMES DAILY
COMMUNITY

## 2023-11-27 RX ORDER — TOPIRAMATE 25 MG/1
25 TABLET ORAL 2 TIMES DAILY
Qty: 60 TABLET | Refills: 3 | Status: SHIPPED | OUTPATIENT
Start: 2023-11-27

## 2023-11-27 RX ORDER — HYDROCODONE BITARTRATE AND ACETAMINOPHEN 10; 325 MG/1; MG/1
TABLET ORAL
COMMUNITY
End: 2023-11-28

## 2023-11-27 RX ORDER — PREGABALIN 50 MG/1
CAPSULE ORAL
COMMUNITY
End: 2023-11-28

## 2023-11-27 RX ORDER — DICYCLOMINE HCL 20 MG
1 TABLET ORAL EVERY 6 HOURS
COMMUNITY

## 2023-11-27 ASSESSMENT — FIBROSIS 4 INDEX: FIB4 SCORE: 0.85

## 2023-11-27 NOTE — PROGRESS NOTES
Subjective:     CC:   Chief Complaint   Patient presents with    Requesting Labs    Referral Needed     Neurology     Migraine       HPI:   Magui presents today with concerns of worsening headaches.  She has a history of migraines which usually over respond to Imitrex as abortive therapy.  These headaches are reportedly different in distribution and intensity, primarily affecting the forehead in a bandlike distribution radiating down the temples into the jaw.  Patient denies any visual symptoms or nausea.  Headaches are minimally responsive to Imitrex and OTC pain medication and are occurring 4 out of 7 days a week.  Patient also reports several month history of polyarthralgia with no specific daytime or nighttime pattern, and does not appear to worsen in intensity with use.  During this time, she also notes worsening fatigue and approximately 15 pound weight gain in the past 30 to 45 days.  Patient denies any changes in diet or physical activity.  Preventative care: Patient is up-to-date on her preventive healthcare screenings however, she recently became sexually active with a new partner and would like to have hepatitis C and HIV screening.  Patient denies any symptoms gynecologic symptoms at this time.    Current Outpatient Medications Ordered in Epic   Medication Sig Dispense Refill    clonazePAM (KLONOPIN) 0.5 MG Tab TAKE 1 TABLET BY MOUTH 1 TIME A DAY AS NEEDED (PANIC ATTACKS) FOR UP TO 30 DAYS.      dicyclomine (BENTYL) 20 MG Tab Take 1 Tablet by mouth every 6 hours.      metoclopramide (REGLAN) 10 MG Tab Take 1 Tablet by mouth 4 times a day.      topiramate (TOPAMAX) 25 MG Tab Take 1 Tablet by mouth 2 times a day. 60 Tablet 3    SUMAtriptan (IMITREX) 25 MG Tab tablet TAKE 1-4 TABLETS BY MOUTH ONE TIME AS NEEDED FOR MIGRAINE. 9 Tablet 2    valACYclovir (VALTREX) 500 MG Tab TAKE 1 TABLET BY MOUTH TWICE A DAY 60 Tablet 1    Multiple Vitamin (MULTIVITAMIN ADULT PO) Take 1 Tablet by mouth every day.       "celecoxib (CELEBREX) 100 MG Cap       morphine ER (MS CONTIN) 15 MG Tab CR tablet       methocarbamol (ROBAXIN) 750 MG Tab       doxycycline monohydrate (ADOXA) 100 MG tablet doxycycline monohydrate 100 mg tablet   Take 1 tablet twice a day by oral route for 7 days. (Patient not taking: Reported on 11/27/2023)      HYDROcodone/acetaminophen (NORCO)  MG Tab hydrocodone 10 mg-acetaminophen 325 mg tablet   TAKE 1 TABLET BY MOUTH EVERY 4 HOURS FOR 7 DAYS (Patient not taking: Reported on 11/27/2023)      pregabalin (LYRICA) 50 MG capsule TAKE 1 TO 2 CAPSULES BY MOUTH AT BEDTIME AS NEEDED (Patient not taking: Reported on 11/27/2023)      ibuprofen (MOTRIN) 600 MG Tab Take 1 Tablet by mouth every 6 hours as needed for Moderate Pain. (Patient not taking: Reported on 11/27/2023) 30 Tablet 3    docusate sodium (COLACE) 100 MG Cap Take 1 Capsule by mouth 2 times a day. (Patient not taking: Reported on 4/25/2023) 60 Capsule 3    MELATONIN PO Take 1 Capsule by mouth every day. (Patient not taking: Reported on 11/27/2023)      Esomeprazole Magnesium 10 MG Pack Take  by mouth. (Patient not taking: Reported on 4/25/2023)       No current [facility]-ordered facility-administered medications on file.       ROS:  Negative except for above in HPI    Objective:     Exam:  /80 (BP Location: Right arm, Patient Position: Sitting, BP Cuff Size: Adult)   Pulse 93   Temp 37.2 °C (99 °F) (Temporal)   Ht 1.778 m (5' 10\")   Wt 79.8 kg (176 lb)   SpO2 98%   BMI 25.25 kg/m²  Body mass index is 25.25 kg/m².    Gen: Alert and oriented, No apparent distress.  HEENT: NCAT, MMM, No lymphadenopathy  Lungs: Normal effort, CTA bilaterally, no wheezes, rhonchi, or rales  CV: Regular rate and rhythm. No murmurs, rubs, or gallops. Radial pulses palpable bilat  Abd: Soft, non-distended, no guarding, no rebound, non-tender to palpation  Ext: No clubbing, cyanosis, edema.  Neuro: Non-focal    Labs: No new labs    Assessment & Plan:     38 y.o. " female with the following -     Problem List Items Addressed This Visit       Chronic nonintractable headache     Several month history of tension-like headache distribution, radiating down into the jaw, affecting the patient approximately 4 out of 7 days a week, refractory to Imitrex and OTC headache medication.  Patient does have a known history of migraines without aura however, these headaches are different and distribution, character and frequency.  Patient would like to have a neurology referral in the meantime is agreeable to try a preventative medication.    Plan:  -Order Topamax 25 mg twice daily  -Referral to neurology headache clinic  -Follow-up in 2 weeks         Relevant Medications    topiramate (TOPAMAX) 25 MG Tab    Other Relevant Orders    Referral to Neurology    Other fatigue     Ongoing for the past several months, other associated symptoms include chronic headache, weight gain and polyarthralgia.  Differentials include thyroid/parathyroid dysfunction, depression and rheumatologic etiologies.    Plan:  -Order TSH with reflex T4 and calcium  -Treat headaches  -Consider rheumatologic workup at next visit should symptoms persist and lab workup come back normal         Relevant Orders    TSH WITH REFLEX TO FT4    HEMOGLOBIN A1C    CALCIUM (CA)    Screening due     Patient is involved with a new sexual partner and would like to have hepatitis C screening as well as HIV screening.  She denies any gynecologic symptoms at this time and is not interested in GC chlamydia testing.  Patient is s/p bilateral tubal ligation.    Plan:  -Order HIV and hepatitis C screening         Relevant Orders    HEP C VIRUS ANTIBODY    HIV AG/AB COMBO ASSAY SCREENING    Weight gain     Reported unintentional 15 pound weight gain in the past 30 to 45 days.  The patient denies any changes in diet or physical activity as well as any significant abdominal distention although she does report feeling occasionally bloated.  Other  symptoms associated during this timeframe include chronic headache, polyarthralgia and fatigue.    Plan:  -Order TSH with reflex T4 and calcium         Polyarthralgia     Subacute, affecting joints in upper and lower extremities equally, atraumatic, not associated with overuse or morning stiffness.  Differentials include OA, thyroid/parathyroid dysfunction, and psychiatric etiology.  Rheumatologic and infectious etiologies are also being considered however, less likely.  Physical exam was unremarkable.    Plan:  -Order TSH with reflex T4 and calcium  -Consider rheumatologic workup at next visit should symptoms persist and labs come back normal            Return in about 2 weeks (around 12/11/2023).    Tony Umaña M.D.

## 2023-11-28 NOTE — ASSESSMENT & PLAN NOTE
Patient is involved with a new sexual partner and would like to have hepatitis C screening as well as HIV screening.  She denies any gynecologic symptoms at this time and is not interested in GC chlamydia testing.  Patient is s/p bilateral tubal ligation.    Plan:  -Order HIV and hepatitis C screening

## 2023-11-28 NOTE — ASSESSMENT & PLAN NOTE
Subacute, affecting joints in upper and lower extremities equally, atraumatic, not associated with overuse or morning stiffness.  Differentials include OA, thyroid/parathyroid dysfunction, and psychiatric etiology.  Rheumatologic and infectious etiologies are also being considered however, less likely.  Physical exam was unremarkable.    Plan:  -Order TSH with reflex T4 and calcium  -Consider rheumatologic workup at next visit should symptoms persist and labs come back normal

## 2023-11-28 NOTE — ASSESSMENT & PLAN NOTE
Ongoing for the past several months, other associated symptoms include chronic headache, weight gain and polyarthralgia.  Differentials include thyroid/parathyroid dysfunction, depression and rheumatologic etiologies.    Plan:  -Order TSH with reflex T4 and calcium  -Treat headaches  -Consider rheumatologic workup at next visit should symptoms persist and lab workup come back normal

## 2023-11-28 NOTE — ASSESSMENT & PLAN NOTE
Reported unintentional 15 pound weight gain in the past 30 to 45 days.  The patient denies any changes in diet or physical activity as well as any significant abdominal distention although she does report feeling occasionally bloated.  Other symptoms associated during this timeframe include chronic headache, polyarthralgia and fatigue.    Plan:  -Order TSH with reflex T4 and calcium

## 2023-11-28 NOTE — ASSESSMENT & PLAN NOTE
Several month history of tension-like headache distribution, radiating down into the jaw, affecting the patient approximately 4 out of 7 days a week, refractory to Imitrex and OTC headache medication.  Patient does have a known history of migraines without aura however, these headaches are different and distribution, character and frequency.  Patient would like to have a neurology referral in the meantime is agreeable to try a preventative medication.    Plan:  -Order Topamax 25 mg twice daily  -Referral to neurology headache clinic  -Follow-up in 2 weeks

## 2024-01-16 DIAGNOSIS — F41.9 ANXIETY: ICD-10-CM

## 2024-01-16 DIAGNOSIS — F41.0 PANIC DISORDER: ICD-10-CM

## 2024-01-16 RX ORDER — CLONAZEPAM 0.5 MG/1
TABLET ORAL
Qty: 30 TABLET | Refills: 2 | Status: SHIPPED | OUTPATIENT
Start: 2024-01-16 | End: 2024-03-21 | Stop reason: SDUPTHER

## 2024-01-22 DIAGNOSIS — G43.709 CHRONIC MIGRAINE WITHOUT AURA WITHOUT STATUS MIGRAINOSUS, NOT INTRACTABLE: ICD-10-CM

## 2024-01-22 RX ORDER — SUMATRIPTAN 25 MG/1
TABLET, FILM COATED ORAL
Qty: 9 TABLET | Refills: 2 | Status: SHIPPED | OUTPATIENT
Start: 2024-01-22

## 2024-03-05 ENCOUNTER — APPOINTMENT (OUTPATIENT)
Dept: MEDICAL GROUP | Facility: CLINIC | Age: 39
End: 2024-03-05
Payer: MEDICAID

## 2024-03-21 ENCOUNTER — OFFICE VISIT (OUTPATIENT)
Dept: MEDICAL GROUP | Facility: CLINIC | Age: 39
End: 2024-03-21
Payer: MEDICAID

## 2024-03-21 VITALS
SYSTOLIC BLOOD PRESSURE: 104 MMHG | HEIGHT: 70 IN | TEMPERATURE: 97 F | WEIGHT: 183 LBS | BODY MASS INDEX: 26.2 KG/M2 | HEART RATE: 75 BPM | OXYGEN SATURATION: 95 % | DIASTOLIC BLOOD PRESSURE: 70 MMHG

## 2024-03-21 DIAGNOSIS — R53.83 FATIGUE, UNSPECIFIED TYPE: ICD-10-CM

## 2024-03-21 DIAGNOSIS — R63.5 WEIGHT GAIN: ICD-10-CM

## 2024-03-21 DIAGNOSIS — F41.0 PANIC DISORDER: ICD-10-CM

## 2024-03-21 DIAGNOSIS — F41.9 ANXIETY: ICD-10-CM

## 2024-03-21 PROCEDURE — 99214 OFFICE O/P EST MOD 30 MIN: CPT | Mod: GC

## 2024-03-21 PROCEDURE — 3078F DIAST BP <80 MM HG: CPT

## 2024-03-21 PROCEDURE — 3074F SYST BP LT 130 MM HG: CPT

## 2024-03-21 RX ORDER — CLONAZEPAM 0.5 MG/1
TABLET ORAL
Qty: 30 TABLET | Refills: 2 | Status: SHIPPED | OUTPATIENT
Start: 2024-03-21 | End: 2024-04-22

## 2024-03-21 RX ORDER — PHENTERMINE HYDROCHLORIDE 15 MG/1
15 CAPSULE ORAL EVERY MORNING
Qty: 60 CAPSULE | Refills: 0 | Status: CANCELLED | OUTPATIENT
Start: 2024-03-21 | End: 2024-05-20

## 2024-03-21 RX ORDER — OXYCODONE HYDROCHLORIDE 10 MG/1
10 TABLET, FILM COATED, EXTENDED RELEASE ORAL EVERY 12 HOURS
COMMUNITY

## 2024-03-21 ASSESSMENT — FIBROSIS 4 INDEX: FIB4 SCORE: 0.85

## 2024-03-21 NOTE — ASSESSMENT & PLAN NOTE
Proximately 20 pound weight gain in the last year, etiology unknown.  Patient reportedly follows a healthy diet, mostly vegetarian, high protein, low carbs, and works out regularly and has a  background.  No obvious organic etiologies identified, recent lab work was unremarkable (CBC, CMP, TSH, calcium), although, hormonal etiologies are also being considered.    Plan:  -Recommended patient obtain a body composition  -Order metformin 500 mg daily, may increase up to 1000 mg daily if tolerated

## 2024-03-21 NOTE — ASSESSMENT & PLAN NOTE
Chronic, complicated by panic attacks, long-term management with clonazepam.  Patient understands this is a high risk medication especially when used with opioids and muscle relaxants.  She is not interested at this time in pursuing alternative treatments that she states she has failed all other treatment options.  Controlled substance agreement on file.    Plan:  -Refill clonazepam 0.5 mg x 30-day supply  -Confirmed patient has Narcan at home and does not need a refill at this time

## 2024-03-21 NOTE — PROGRESS NOTES
Subjective:     CC:   Follow-up      HPI:   Magui presents today for follow-up on headaches.  I last saw the patient on 11/27 at which point we had started Topamax 25 mg twice daily and had placed a referral to neurology.  Additionally, labs were ordered to workup weight gain with associated fatigue and arthralgias (TSH with reflex T4, calcium levels).    Patient has since been able to get into neurology and unfortunately Topamax did not work.  Neurologist additionally tried amitriptyline with no improvement and patient poorly tolerated the medication.  Patient is now awaiting Botox which is scheduled to receive first treatment next week.  Additionally, patient continues to express concerns regarding her weight gain.  She notes that she was 160 pounds approximately 1 year ago and is presently 193 pounds.  She is reportedly following a healthy primarily vegetarian diet consisting of high-protein low carbs.  She also continues to workout regularly and has a strong background in fitness and diet as she used to be a .  Despite this, she notes that she continues to have poor energy and her weight is not improving.   Patient expressed interest in pursuing pharmacotherapy however, given her BMI in the absence of any comorbidities, patient does not qualify for phentermine and she has no interest in Ozempic or Wegovy which will also be off label use.  Patient is agreeable to pursue metformin as a possible etiology for her weight gain may be related to her current medications which include benzos and opioids.      Current Outpatient Medications Ordered in Epic   Medication Sig Dispense Refill    OXYCONTIN 10 MG Tablet Extended Release 12 hour Abuse-Deterrent Take 10 mg by mouth every 12 hours. FOR 30 DAYS      clonazePAM (KLONOPIN) 0.5 MG Tab TAKE 1 TABLET BY MOUTH 1 TIME A DAY AS NEEDED (PANIC ATTACKS) FOR UP TO 30 DAYS.  Indications: Panic Disorder 30 Tablet 2    metFORMIN (GLUCOPHAGE) 500 MG Tab Take 1  "Tablet by mouth 2 times a day with meals. 60 Tablet 1    SUMAtriptan (IMITREX) 25 MG Tab tablet TAKE 1 TO 4 TABLETS BY MOUTH ONE TIME AS NEEDED FOR MIGRAINE. 9 Tablet 2    dicyclomine (BENTYL) 20 MG Tab Take 1 Tablet by mouth every 6 hours.      metoclopramide (REGLAN) 10 MG Tab Take 1 Tablet by mouth 4 times a day.      topiramate (TOPAMAX) 25 MG Tab Take 1 Tablet by mouth 2 times a day. 60 Tablet 3    valACYclovir (VALTREX) 500 MG Tab TAKE 1 TABLET BY MOUTH TWICE A DAY 60 Tablet 1    Multiple Vitamin (MULTIVITAMIN ADULT PO) Take 1 Tablet by mouth every day.      celecoxib (CELEBREX) 100 MG Cap       methocarbamol (ROBAXIN) 750 MG Tab        No current Epic-ordered facility-administered medications on file.       ROS:  Negative except for above in HPI    Objective:     Exam:  /70   Pulse 75   Temp 36.1 °C (97 °F) (Temporal)   Ht 1.778 m (5' 10\")   Wt 83 kg (183 lb)   SpO2 95%   BMI 26.26 kg/m²  Body mass index is 26.26 kg/m².    Gen: Alert and oriented, well-appearing, tearful when talking about weight  HEENT: Normocephalic, atraumatic, EOMI, moist mucous membranes, no lymphadenopathy, neck supple  Lungs: Normal effort, CTA bilaterally, no wheezes, rhonchi, or rales  CV: Regular rate and rhythm. No murmurs, rubs, or gallops. Radial pulses palpable bilat  Abd: Soft, non-distended, no guarding, no rebound, non-tender to palpation  Ext: No clubbing, cyanosis, edema.  Neuro: Non-focal      Assessment & Plan:     38 y.o. female with the following -     Problem List Items Addressed This Visit       Anxiety     Chronic, complicated by panic attacks, long-term management with clonazepam.  Patient understands this is a high risk medication especially when used with opioids and muscle relaxants.  She is not interested at this time in pursuing alternative treatments that she states she has failed all other treatment options.  Controlled substance agreement on file.    Plan:  -Refill clonazepam 0.5 mg x 30-day " supply  -Confirmed patient has Narcan at home and does not need a refill at this time         Relevant Medications    clonazePAM (KLONOPIN) 0.5 MG Tab    Panic disorder    Relevant Medications    clonazePAM (KLONOPIN) 0.5 MG Tab    Weight gain     Proximately 20 pound weight gain in the last year, etiology unknown.  Patient reportedly follows a healthy diet, mostly vegetarian, high protein, low carbs, and works out regularly and has a  background.  No obvious organic etiologies identified, recent lab work was unremarkable (CBC, CMP, TSH, calcium), although, hormonal etiologies are also being considered.    Plan:  -Recommended patient obtain a body composition  -Order metformin 500 mg daily, may increase up to 1000 mg daily if tolerated         Relevant Medications    metFORMIN (GLUCOPHAGE) 500 MG Tab    Other Relevant Orders    HEMOGLOBIN A1C     Other Visit Diagnoses       Fatigue, unspecified type        Relevant Orders    HEMOGLOBIN A1C            Return in about 1 month (around 4/21/2024).    Tony Umaña M.D.

## 2024-04-15 ENCOUNTER — HOSPITAL ENCOUNTER (OUTPATIENT)
Dept: LAB | Facility: MEDICAL CENTER | Age: 39
End: 2024-04-15
Payer: MEDICAID

## 2024-04-15 DIAGNOSIS — R63.5 WEIGHT GAIN: ICD-10-CM

## 2024-04-15 DIAGNOSIS — R53.83 FATIGUE, UNSPECIFIED TYPE: ICD-10-CM

## 2024-04-15 LAB
EST. AVERAGE GLUCOSE BLD GHB EST-MCNC: 108 MG/DL
HBA1C MFR BLD: 5.4 % (ref 4–5.6)

## 2024-04-15 PROCEDURE — 83036 HEMOGLOBIN GLYCOSYLATED A1C: CPT

## 2024-04-15 PROCEDURE — 36415 COLL VENOUS BLD VENIPUNCTURE: CPT

## 2024-04-20 ENCOUNTER — PATIENT MESSAGE (OUTPATIENT)
Dept: MEDICAL GROUP | Facility: CLINIC | Age: 39
End: 2024-04-20
Payer: MEDICAID

## 2024-05-06 ENCOUNTER — APPOINTMENT (OUTPATIENT)
Dept: MEDICAL GROUP | Facility: CLINIC | Age: 39
End: 2024-05-06
Payer: MEDICAID

## 2024-05-06 VITALS
SYSTOLIC BLOOD PRESSURE: 138 MMHG | WEIGHT: 198 LBS | HEIGHT: 70 IN | TEMPERATURE: 98 F | BODY MASS INDEX: 28.35 KG/M2 | OXYGEN SATURATION: 95 % | DIASTOLIC BLOOD PRESSURE: 84 MMHG | HEART RATE: 88 BPM

## 2024-05-06 DIAGNOSIS — Z79.899 CONTROLLED SUBSTANCE AGREEMENT SIGNED: ICD-10-CM

## 2024-05-06 DIAGNOSIS — M25.50 POLYARTHRALGIA: ICD-10-CM

## 2024-05-06 DIAGNOSIS — R63.5 WEIGHT GAIN, ABNORMAL: ICD-10-CM

## 2024-05-06 DIAGNOSIS — R53.83 OTHER FATIGUE: ICD-10-CM

## 2024-05-06 PROCEDURE — 99213 OFFICE O/P EST LOW 20 MIN: CPT | Mod: GE

## 2024-05-06 RX ORDER — PHENTERMINE HYDROCHLORIDE 15 MG/1
15 CAPSULE ORAL EVERY MORNING
Qty: 30 CAPSULE | Refills: 0 | Status: SHIPPED | OUTPATIENT
Start: 2024-05-06 | End: 2024-05-29

## 2024-05-06 ASSESSMENT — PATIENT HEALTH QUESTIONNAIRE - PHQ9
4. FEELING TIRED OR HAVING LITTLE ENERGY: NOT AT ALL
SUM OF ALL RESPONSES TO PHQ QUESTIONS 1-9: 0
7. TROUBLE CONCENTRATING ON THINGS, SUCH AS READING THE NEWSPAPER OR WATCHING TELEVISION: NOT AT ALL
5. POOR APPETITE OR OVEREATING: NOT AT ALL
3. TROUBLE FALLING OR STAYING ASLEEP OR SLEEPING TOO MUCH: NOT AT ALL
SUM OF ALL RESPONSES TO PHQ9 QUESTIONS 1 AND 2: 0
1. LITTLE INTEREST OR PLEASURE IN DOING THINGS: NOT AT ALL
6. FEELING BAD ABOUT YOURSELF - OR THAT YOU ARE A FAILURE OR HAVE LET YOURSELF OR YOUR FAMILY DOWN: NOT AL ALL
9. THOUGHTS THAT YOU WOULD BE BETTER OFF DEAD, OR OF HURTING YOURSELF: NOT AT ALL
2. FEELING DOWN, DEPRESSED, IRRITABLE, OR HOPELESS: NOT AT ALL
8. MOVING OR SPEAKING SO SLOWLY THAT OTHER PEOPLE COULD HAVE NOTICED. OR THE OPPOSITE, BEING SO FIGETY OR RESTLESS THAT YOU HAVE BEEN MOVING AROUND A LOT MORE THAN USUAL: NOT AT ALL

## 2024-05-06 ASSESSMENT — FIBROSIS 4 INDEX: FIB4 SCORE: 0.85

## 2024-05-06 NOTE — ASSESSMENT & PLAN NOTE
Chronic, unstable, patient continues to gain weight monthly, etiology unknown, no obvious metabolic disorders have been identified.  Patient would like to try phentermine.  We discussed that this will be technically an off label use of the medication given that she does not meet requirement of obesity and through shared decision making, we have decided to proceed with this treatment.  We also discussed that there is a possibility that her chronic refractory migraines might be poorly affected negatively and that she is to discontinue this medication immediately should this occur.    Plan:  -Order phentermine 15 mg daily x 30 days  -Reevaluate after 30 days to determine if increase in dose appropriate  -Controlled substance agreement signed and scanned in

## 2024-05-06 NOTE — ASSESSMENT & PLAN NOTE
Chronic, etiology unknown, previous autoimmune and metabolic disorders ruled out.  Patient would like to have Lyme disease antibody screening performed given that she does spend a lot of time at [facility].  We discussed that this is highly unlikely however, an easy enough test to get completed.    Plan:  -Order Lyme disease antibody screening

## 2024-05-06 NOTE — PROGRESS NOTES
Subjective:     CC:   Chief Complaint   Patient presents with    Follow-Up     Follow up Wait     Medication Refill       HPI:   Magui presents today to discuss weight loss medication.  This is our second visit regarding this topic.  Patient understands that she does not meet criteria for starting weight loss medication however, given her history of persistent ongoing unexplained weight gain, we have decided to proceed with starting phentermine through shared decision making.  We also discussed that there is a possibility that this medication may make her refractory migraines worse and that she is to stop this medication immediately should this occur.  Additionally, patient would like to have antibody testing for Lyme's disease given her ongoing unexplained fatigue and polyarthralgia.  Previous autoimmune workups have been negative.      Problem   Controlled Substance Agreement Signed   Other Fatigue   Weight Gain, Abnormal   Polyarthralgia       Current Outpatient Medications Ordered in Epic   Medication Sig Dispense Refill    phentermine 15 MG capsule Take 1 Capsule by mouth every morning for 30 days. 30 Capsule 0    OXYCONTIN 10 MG Tablet Extended Release 12 hour Abuse-Deterrent Take 10 mg by mouth every 12 hours. FOR 30 DAYS      SUMAtriptan (IMITREX) 25 MG Tab tablet TAKE 1 TO 4 TABLETS BY MOUTH ONE TIME AS NEEDED FOR MIGRAINE. 9 Tablet 2    dicyclomine (BENTYL) 20 MG Tab Take 1 Tablet by mouth every 6 hours.      metoclopramide (REGLAN) 10 MG Tab Take 1 Tablet by mouth 4 times a day.      valACYclovir (VALTREX) 500 MG Tab TAKE 1 TABLET BY MOUTH TWICE A DAY 60 Tablet 1    Multiple Vitamin (MULTIVITAMIN ADULT PO) Take 1 Tablet by mouth every day.      celecoxib (CELEBREX) 100 MG Cap       methocarbamol (ROBAXIN) 750 MG Tab        No current Epic-ordered facility-administered medications on file.       ROS:  Negative except for above in HPI    Objective:     Exam:  /84   Pulse 88   Temp 36.7 °C (98 °F)  "  Ht 1.778 m (5' 10\")   Wt 89.8 kg (198 lb)   SpO2 95%   BMI 28.41 kg/m²  Body mass index is 28.41 kg/m².    Gen: NAD, well-appearing, cooperative  HEENT: NCAT, MMM, No lymphadenopathy  Lungs: Normal effort, CTA bilaterally, no wheezes, rhonchi, or rales  CV: Regular rate and rhythm. No murmurs, rubs, or gallops. Radial pulses palpable bilat  Ext: No clubbing, cyanosis, edema.  Neuro: Non-focal      Assessment & Plan:     38 y.o. female with the following -     Problem List Items Addressed This Visit       Other fatigue     Chronic, etiology unknown, previous autoimmune and metabolic disorders ruled out.  Patient would like to have Lyme disease antibody screening performed given that she does spend a lot of time at [facility].  We discussed that this is highly unlikely however, an easy enough test to get completed.    Plan:  -Order Lyme disease antibody screening         Relevant Orders    LYME DISEASE AB TOTAL/IGM    Weight gain, abnormal     Chronic, unstable, patient continues to gain weight monthly, etiology unknown, no obvious metabolic disorders have been identified.  Patient would like to try phentermine.  We discussed that this will be technically an off label use of the medication given that she does not meet requirement of obesity and through shared decision making, we have decided to proceed with this treatment.  We also discussed that there is a possibility that her chronic refractory migraines might be poorly affected negatively and that she is to discontinue this medication immediately should this occur.    Plan:  -Order phentermine 15 mg daily x 30 days  -Reevaluate after 30 days to determine if increase in dose appropriate  -Controlled substance agreement signed and scanned in         Relevant Medications    phentermine 15 MG capsule    Polyarthralgia     See assessment and plan for polyarthralgias.         Relevant Orders    LYME DISEASE AB TOTAL/IGM    Controlled substance agreement signed    " Relevant Medications    phentermine 15 MG capsule    Other Relevant Orders    Controlled Substance Treatment Agreement       Return in about 3 months (around 8/6/2024).    Tony Umaña M.D.

## 2024-05-20 DIAGNOSIS — F41.9 ANXIETY: ICD-10-CM

## 2024-05-20 DIAGNOSIS — F41.0 PANIC DISORDER: ICD-10-CM

## 2024-05-20 RX ORDER — CLONAZEPAM 0.5 MG/1
TABLET ORAL
Qty: 30 TABLET | Refills: 0 | Status: SHIPPED | OUTPATIENT
Start: 2024-05-20 | End: 2024-05-29 | Stop reason: SDUPTHER

## 2024-05-28 ENCOUNTER — HOSPITAL ENCOUNTER (OUTPATIENT)
Dept: LAB | Facility: MEDICAL CENTER | Age: 39
End: 2024-05-28
Payer: MEDICAID

## 2024-05-29 DIAGNOSIS — R63.5 WEIGHT GAIN, ABNORMAL: ICD-10-CM

## 2024-05-29 DIAGNOSIS — F41.0 PANIC DISORDER: ICD-10-CM

## 2024-05-29 DIAGNOSIS — F41.9 ANXIETY: ICD-10-CM

## 2024-05-29 DIAGNOSIS — R53.83 OTHER FATIGUE: ICD-10-CM

## 2024-05-29 RX ORDER — CLONAZEPAM 0.5 MG/1
0.5 TABLET ORAL
Qty: 30 TABLET | Refills: 0 | Status: SHIPPED | OUTPATIENT
Start: 2024-07-24 | End: 2024-08-23

## 2024-05-29 RX ORDER — CLONAZEPAM 0.5 MG/1
0.5 TABLET ORAL
Qty: 30 TABLET | Refills: 0 | Status: SHIPPED | OUTPATIENT
Start: 2024-06-23 | End: 2024-07-23

## 2024-05-29 RX ORDER — PHENTERMINE HYDROCHLORIDE 30 MG/1
30 CAPSULE ORAL EVERY MORNING
Qty: 30 CAPSULE | Refills: 0 | Status: SHIPPED | OUTPATIENT
Start: 2024-05-29 | End: 2024-06-28

## 2024-05-30 LAB
B BURGDOR IGG SER QL IB: NEGATIVE
B BURGDOR IGM SER QL IB: NEGATIVE

## 2024-06-14 ENCOUNTER — APPOINTMENT (OUTPATIENT)
Dept: URGENT CARE | Facility: CLINIC | Age: 39
End: 2024-06-14
Payer: MEDICAID

## 2024-06-14 ENCOUNTER — OFFICE VISIT (OUTPATIENT)
Dept: MEDICAL GROUP | Facility: CLINIC | Age: 39
End: 2024-06-14
Payer: MEDICAID

## 2024-06-14 VITALS
HEART RATE: 100 BPM | BODY MASS INDEX: 27.49 KG/M2 | DIASTOLIC BLOOD PRESSURE: 65 MMHG | TEMPERATURE: 98 F | WEIGHT: 192 LBS | RESPIRATION RATE: 20 BRPM | OXYGEN SATURATION: 96 % | HEIGHT: 70 IN | SYSTOLIC BLOOD PRESSURE: 105 MMHG

## 2024-06-14 DIAGNOSIS — J02.9 SORE THROAT: ICD-10-CM

## 2024-06-14 LAB
HETEROPH AB SER QL LA: NEGATIVE
POCT INT CON NEG: NEGATIVE
POCT INT CON POS: POSITIVE
S PYO DNA SPEC NAA+PROBE: DETECTED

## 2024-06-14 PROCEDURE — 3078F DIAST BP <80 MM HG: CPT | Performed by: STUDENT IN AN ORGANIZED HEALTH CARE EDUCATION/TRAINING PROGRAM

## 2024-06-14 PROCEDURE — 86308 HETEROPHILE ANTIBODY SCREEN: CPT | Performed by: STUDENT IN AN ORGANIZED HEALTH CARE EDUCATION/TRAINING PROGRAM

## 2024-06-14 PROCEDURE — 87651 STREP A DNA AMP PROBE: CPT | Performed by: STUDENT IN AN ORGANIZED HEALTH CARE EDUCATION/TRAINING PROGRAM

## 2024-06-14 PROCEDURE — 3074F SYST BP LT 130 MM HG: CPT | Performed by: STUDENT IN AN ORGANIZED HEALTH CARE EDUCATION/TRAINING PROGRAM

## 2024-06-14 PROCEDURE — 99213 OFFICE O/P EST LOW 20 MIN: CPT | Performed by: STUDENT IN AN ORGANIZED HEALTH CARE EDUCATION/TRAINING PROGRAM

## 2024-06-14 RX ORDER — LIDOCAINE HYDROCHLORIDE 20 MG/ML
15 SOLUTION OROPHARYNGEAL
Qty: 200 ML | Refills: 0 | Status: SHIPPED | OUTPATIENT
Start: 2024-06-14

## 2024-06-14 RX ORDER — PENICILLIN V POTASSIUM 500 MG/1
500 TABLET ORAL
Qty: 20 TABLET | Refills: 0 | Status: SHIPPED | OUTPATIENT
Start: 2024-06-14 | End: 2024-06-24

## 2024-06-14 ASSESSMENT — FIBROSIS 4 INDEX: FIB4 SCORE: 0.85

## 2024-06-14 NOTE — LETTER
June 14, 2024      To Whom It May Concern,    RE:   Magui Mallory  [address]   [address]  [place] [address]    Magui is an established patient with our clinic and was seen for scheduled appointment on 6/14/2024.  For medical reasons, I have advised that she be excused from work for the next 48 hours.  She is cleared to return to work on 6/16/2024.    If you have any questions or concerns, please don't hesitate to call.        Sincerely,        Eron Mccauley M.D.    Electronically Signed

## 2024-06-14 NOTE — PROGRESS NOTES
[facility] OFFICE VISIT    Date: 6/14/2024    MRN: [id number]  Patient ID: Magui Mallory    SUBJECTIVE:  Magui Mallory is a 38 y.o. female here for evaluation of sore throat.  Patient reports that symptoms began within the past 24-48 hours.  Denies any known fevers, cough, diarrhea, or vomiting.  Does endorse headache, malaise, and sinus pressure. No known sick contacts.  Reports that sore throat has worsened the point of difficulty swallowing, but denies any difficulty with breathing.  Patient has been taking over-the-counter NSAIDs as well as home medications for treatment of other chronic pain related conditions.    PMHx/PSHx:  Past Medical History:   Diagnosis Date    Arthritis     osteo, hands,wrist,hips,knees    Herpes     Pain     hips, sacrum back, shoulders, knees    Psychiatric problem     anxiety     Patient Active Problem List   Diagnosis    Rotator cuff injury, right, initial encounter    Pain in joint, pelvic region and thigh    Anxiety    Encounter for long-term methadone use    Herpesvirus infection    History of viral infection    Migraine    Mild episode of recurrent major depressive disorder (HCC)    Panic disorder    Steatosis of liver    Tobacco use    Trichotillomania    Encounter for sterilization    Skin mole    Weakness of both lower extremities    Low serum cortisol level    Arthralgia of hip    Chronic nonintractable headache    Other fatigue    Screening due    Weight gain, abnormal    Polyarthralgia    Controlled substance agreement signed     Past Surgical History:   Procedure Laterality Date    [place] LAP,DIAGNOSTIC ABDOMEN  3/3/2023    Procedure: LAPAROSCOPIC BILATERAL SALPINGECTOMY;  Surgeon: Oneyda Donnelly M.D.;  Location: SURGERY SAME DAY [facility];  Service: Labor and Delivery    MENISCUS REPAIR  2015       Allergies: Codeine    OBJECTIVE:  Vitals:    06/14/24 1254   BP: 105/65   Pulse: 100   Resp: 20   Temp: 36.7 °C (98 °F)   SpO2: 96%  "    Vitals:    06/14/24 1254   BP: 105/65   Weight: 87.1 kg (192 lb)   Height: 1.778 m (5' 10\")       Physical Examination:  General: Tired appearing female, resting on arrival to room  HEENT: Normocephalic, atraumatic, clear right tympanic membrane, left tympanic membrane injection without bulging appearance, nares patent, faint posterior oropharyngeal erythema without exudates, neck supple, faint anterior cervical lymphadenopathy  Cardiovascular: RRR, no murmurs, gallops, or rubs  Pulmonary: CTAB, symmetrical chest expansion, no rales, rhonchi, or wheezes  Extremities: Moves all spontaneously  Neurological: Alert and oriented    ASSESSMENT & PLAN:  Magui Mallory is a 38 y.o. female here for evaluation of sore throat as discussed below.    1. Sore throat  POCT Mononucleosis (mono)    POCT CEPHEID GROUP A STREP - PCR    penicillin v potassium (VEETID) 500 MG Tab    lidocaine (XYLOCAINE) 2 % Solution          Orders Placed This Encounter    POCT Mononucleosis (mono)    POCT CEPHEID GROUP A STREP - PCR    penicillin v potassium (VEETID) 500 MG Tab    lidocaine (XYLOCAINE) 2 % Solution       # Sore throat   Performed point-of-care mononucleosis and strep test, with patient testing positive for strep infection.  Discussed possibility of twice daily versus 3 times daily antibiotics, with patient opting for twice daily antibiotics at this time.  Have prescribed penicillin V 500 mg oral twice daily for 10-day course.  Medication sent to pharmacy of choice.  Also discussed possibility of viscous lidocaine as needed for throat pain, and at this time have prescribed according to patient wishes.  Provided patient with work note stating that she may return to work in approximately 48 hours after starting antibiotics.  Patient verbalized understanding and agreement with plan of care.    Eron Mccauley M.D.          "

## 2024-06-23 DIAGNOSIS — R63.5 WEIGHT GAIN, ABNORMAL: ICD-10-CM

## 2024-06-23 DIAGNOSIS — R53.83 OTHER FATIGUE: ICD-10-CM

## 2024-06-24 NOTE — TELEPHONE ENCOUNTER
phentermine 30 MG capsule         Sig: Take 1 Capsule by mouth every morning for 30 days.    Disp: 30 Capsule    Refills: 0    Start: 6/23/2024 - 7/23/2024    Class: Normal    Non-formulary For: Weight gain, abnormal; Other fatigue

## 2024-06-28 RX ORDER — PHENTERMINE HYDROCHLORIDE 30 MG/1
30 CAPSULE ORAL EVERY MORNING
Qty: 30 CAPSULE | Refills: 0 | Status: SHIPPED | OUTPATIENT
Start: 2024-06-28 | End: 2024-07-28

## 2024-07-10 ENCOUNTER — PATIENT MESSAGE (OUTPATIENT)
Dept: MEDICAL GROUP | Facility: CLINIC | Age: 39
End: 2024-07-10
Payer: MEDICAID

## 2024-07-11 ENCOUNTER — PATIENT MESSAGE (OUTPATIENT)
Dept: MEDICAL GROUP | Facility: CLINIC | Age: 39
End: 2024-07-11
Payer: MEDICAID

## 2024-07-12 ENCOUNTER — APPOINTMENT (OUTPATIENT)
Dept: MEDICAL GROUP | Facility: CLINIC | Age: 39
End: 2024-07-12
Payer: MEDICAID

## 2024-07-30 ENCOUNTER — APPOINTMENT (OUTPATIENT)
Dept: MEDICAL GROUP | Facility: CLINIC | Age: 39
End: 2024-07-30
Payer: MEDICAID

## 2024-08-05 DIAGNOSIS — R53.83 OTHER FATIGUE: ICD-10-CM

## 2024-08-05 DIAGNOSIS — R63.5 WEIGHT GAIN, ABNORMAL: ICD-10-CM

## 2024-08-05 DIAGNOSIS — F41.9 ANXIETY: ICD-10-CM

## 2024-08-05 DIAGNOSIS — F41.0 PANIC DISORDER: ICD-10-CM

## 2024-08-05 RX ORDER — PHENTERMINE HYDROCHLORIDE 30 MG/1
30 CAPSULE ORAL EVERY MORNING
Qty: 30 CAPSULE | Refills: 0 | Status: SHIPPED | OUTPATIENT
Start: 2024-08-05 | End: 2024-08-09 | Stop reason: SDUPTHER

## 2024-08-05 RX ORDER — CLONAZEPAM 0.5 MG/1
0.5 TABLET ORAL
Qty: 30 TABLET | Refills: 0 | Status: SHIPPED | OUTPATIENT
Start: 2024-08-05 | End: 2024-08-09 | Stop reason: SDUPTHER

## 2024-08-09 ENCOUNTER — APPOINTMENT (OUTPATIENT)
Dept: MEDICAL GROUP | Facility: CLINIC | Age: 39
End: 2024-08-09
Payer: MEDICAID

## 2024-08-09 VITALS
BODY MASS INDEX: 27.77 KG/M2 | OXYGEN SATURATION: 97 % | SYSTOLIC BLOOD PRESSURE: 123 MMHG | TEMPERATURE: 97.6 F | HEIGHT: 70 IN | DIASTOLIC BLOOD PRESSURE: 74 MMHG | HEART RATE: 99 BPM | WEIGHT: 194 LBS

## 2024-08-09 DIAGNOSIS — F41.9 ANXIETY: ICD-10-CM

## 2024-08-09 DIAGNOSIS — F11.90 CHRONIC, CONTINUOUS USE OF OPIOIDS: ICD-10-CM

## 2024-08-09 DIAGNOSIS — Z76.89 ENCOUNTER TO ESTABLISH CARE: ICD-10-CM

## 2024-08-09 DIAGNOSIS — R63.5 WEIGHT GAIN, ABNORMAL: ICD-10-CM

## 2024-08-09 DIAGNOSIS — R53.83 OTHER FATIGUE: ICD-10-CM

## 2024-08-09 DIAGNOSIS — Z00.00 PREVENTATIVE HEALTH CARE: ICD-10-CM

## 2024-08-09 DIAGNOSIS — F41.0 PANIC DISORDER: ICD-10-CM

## 2024-08-09 DIAGNOSIS — L98.9 SKIN LESION: ICD-10-CM

## 2024-08-09 PROCEDURE — 3074F SYST BP LT 130 MM HG: CPT

## 2024-08-09 PROCEDURE — 3078F DIAST BP <80 MM HG: CPT

## 2024-08-09 PROCEDURE — 99213 OFFICE O/P EST LOW 20 MIN: CPT | Mod: GE

## 2024-08-09 RX ORDER — PHENTERMINE HYDROCHLORIDE 30 MG/1
30 CAPSULE ORAL EVERY MORNING
Qty: 30 CAPSULE | Refills: 0 | Status: SHIPPED | OUTPATIENT
Start: 2024-09-05 | End: 2024-10-05

## 2024-08-09 RX ORDER — PHENTERMINE HYDROCHLORIDE 30 MG/1
30 CAPSULE ORAL EVERY MORNING
Qty: 30 CAPSULE | Refills: 0 | Status: SHIPPED | OUTPATIENT
Start: 2024-10-07 | End: 2024-11-06

## 2024-08-09 RX ORDER — PHENTERMINE HYDROCHLORIDE 30 MG/1
30 CAPSULE ORAL EVERY MORNING
Qty: 30 CAPSULE | Refills: 0 | Status: SHIPPED | OUTPATIENT
Start: 2024-11-07 | End: 2024-12-07

## 2024-08-09 RX ORDER — CLONAZEPAM 0.5 MG/1
0.5 TABLET ORAL
Qty: 30 TABLET | Refills: 0 | Status: SHIPPED | OUTPATIENT
Start: 2024-09-06 | End: 2024-10-06

## 2024-08-09 ASSESSMENT — FIBROSIS 4 INDEX: FIB4 SCORE: 0.85

## 2024-08-09 NOTE — ASSESSMENT & PLAN NOTE
3 benign-appearing skin lesions located on right arm, right leg and right side of trunk/flank, all benign-appearing.  Patient has a strong family history of skin cancers and would like a referral to dermatology to establish care.    Plan:  - Referral to dermatology

## 2024-08-09 NOTE — ASSESSMENT & PLAN NOTE
Chronic, improved, stable, etiology undetermined in setting of known history of autoimmune disorder and poorly controlled anxiety.

## 2024-08-09 NOTE — ASSESSMENT & PLAN NOTE
Chronic, stable, managed long-term with clonazepam 0.5 mg daily.  Current treatment plan not ideal or standard of care for anxiety disorder however, patient remains apprehensive to make changes given that she has failed in the past treatment with Celexa, bupropion, Zoloft and Prozac.  Patient open to referral to psychiatry and psychology for therapy.  Patient understands the significant risks associated with this medication especially in combination with chronic opioid use and now also ongoing treatment with stimulant (phentermine).  Patient wishes to proceed with current treatment plan.    Plan:  - Refill clonazepam 0.5 mg daily  - Referral to psychiatry and psychology

## 2024-08-09 NOTE — ASSESSMENT & PLAN NOTE
Chronic, stable, complicated by panic attack disorder, currently being managed slowly with clonazepam 0.5 mg.  Patient has failed therapy in the past with Celexa, bupropion, Zoloft and Prozac.  She is open to referral to psychiatry and psychology.    Plan:  - Referral to psychology/psychiatry

## 2024-08-09 NOTE — ASSESSMENT & PLAN NOTE
BMI 27.84 kg/m², improved since starting on phentermine.  Patient still does not meet diagnostic criteria for obesity or treatment with pharmacotherapy however, patient remains agreeable to accept the potential risks/side effects of proceeding with treatment with phentermine.  Patient remains highly motivated to make dietary and lifestyle modifications.  Ongoing treatment complicated by chronic use of opioid medication and clonazepam.    Plan:  - Refill phentermine 30 mg daily  - Prescribed medication holiday 2 to 4 days  - Continue to encourage healthy lifestyle and dietary choices  - Anticipate discontinuing phentermine after next 90-day of medication treatment

## 2024-08-09 NOTE — PROGRESS NOTES
Subjective:     CC:   Chief Complaint   Patient presents with    Medication Refill    Referral Needed     Dermatology referral       HPI:   Magui presents today for medication refill of controlled substances and to request referral to dermatology.  1.  Medication refill: Patient would like to request medication refill for phentermine being prescribed for weight loss purposes and clonazepam 0.5 mg for anxiety with panic attack disorder.  Patient reports mild weight loss since starting phentermine in May.  Unfortunately, she notes a setback in the setting of having to revert back to a more sedentary lifestyle due to her daughter becoming ill and requiring surgical intervention.  Her daughter has since recovered and she is anticipating to resume her workout routine.  At this time, patient would like to continue with phentermine and is agreeable to take a 2 to 4-day medication holiday.  Patient continues to overall tolerate the medication well and denies any negative side effects at this time.  Regarding clonazepam refill: This a long time medication for this patient in setting of refractory treatment with Celexa, bupropion, Zoloft and Prozac.  We discussed at length that this is not first-line treatment for anxiety and that she would benefit from starting on a better medication and only use clonazepam for breakthrough.  Patient is agreeable to having a referral placed for psychiatry and psychology.  She has attended therapy in the past and has found it to be beneficial.  Patient has no questions regarding further use of this medication.    Additionally, current treatment plan of anxiety and weight loss is complicated by her chronic use of opioid medications setting of chronic pain.  Pain medication is managed by a pain specialist.  Patient denies any prior history or concerns for drug overdose.  Patient reports that she has naloxone on hand but would like to have a refill placed.  We discussed ongoing use of current  regimen not being ideal and high risk with the potential of death.  Patient agreeable to proceed with current regimen and is not interested in changing at this time.  2.  Referral to dermatology: Patient reports she has a strong family history of skin cancers and has not been seen by dermatologist in the past.  She only has 3 lesions currently that she has suspicion for potentially being malignant and would like to have them evaluated by a dermatologist.  Patient reports that her son was recently established and had a suspicious lesion removed from their abdomen and was benign.  She would like to establish with the same dermatologist at [facility] dermatology.    Current Outpatient Medications Ordered in Epic   Medication Sig Dispense Refill    [START ON 11/7/2024] phentermine 30 MG capsule Take 1 Capsule by mouth every morning for 30 days. 30 Capsule 0    [START ON 10/7/2024] phentermine 30 MG capsule Take 1 Capsule by mouth every morning for 30 days. 30 Capsule 0    [START ON 9/5/2024] phentermine 30 MG capsule Take 1 Capsule by mouth every morning for 30 days. 30 Capsule 0    [START ON 9/6/2024] clonazePAM (KLONOPIN) 0.5 MG Tab Take 1 Tablet by mouth 1 time a day as needed (Panic attacks) for up to 30 days. Indications: Panic Disorder 30 Tablet 0    Naloxone (NARCAN) 4 MG/0.1ML Liquid One spray in one nostril for overdose and call 911. 1 Each 1    lidocaine (XYLOCAINE) 2 % Solution Take 15 mL by mouth every 3 hours as needed for Throat/Mouth Pain. 200 mL 0    OXYCONTIN 10 MG Tablet Extended Release 12 hour Abuse-Deterrent Take 10 mg by mouth every 12 hours. FOR 30 DAYS      SUMAtriptan (IMITREX) 25 MG Tab tablet TAKE 1 TO 4 TABLETS BY MOUTH ONE TIME AS NEEDED FOR MIGRAINE. 9 Tablet 2    dicyclomine (BENTYL) 20 MG Tab Take 1 Tablet by mouth every 6 hours.      metoclopramide (REGLAN) 10 MG Tab Take 1 Tablet by mouth 4 times a day.      valACYclovir (VALTREX) 500 MG Tab TAKE 1 TABLET BY MOUTH TWICE A DAY 60 Tablet  "1    Multiple Vitamin (MULTIVITAMIN ADULT PO) Take 1 Tablet by mouth every day.      celecoxib (CELEBREX) 100 MG Cap       methocarbamol (ROBAXIN) 750 MG Tab        No current Epic-ordered facility-administered medications on file.       ROS:  Negative except for above in HPI    Objective:     Exam:  /74   Pulse 99   Temp 36.4 °C (97.6 °F)   Ht 1.778 m (5' 10\")   Wt 88 kg (194 lb)   SpO2 97%   BMI 27.84 kg/m²  Body mass index is 27.84 kg/m².    Gen: Pleasant, cooperative, alert and oriented no acute distress  HEENT: NCAT, MMM, No lymphadenopathy  Lungs: Normal effort, CTA bilaterally, no wheezes, rhonchi, or rales  CV: Regular rate and rhythm. No murmurs, rubs, or gallops. Radial pulses palpable bilat  Skin: Small macular papular lesion on right flank, benign-appearing, small macular papular lesion on right forearm distally anterior surface, benign-appearing, small pink raised lesion on medial aspect of right lower leg, benign-appearing, nontender, nonerythematous  Ext: No clubbing, cyanosis, edema.  Neuro: Non-focal      Assessment & Plan:     38 y.o. female with the following -     Problem List Items Addressed This Visit       Anxiety     Chronic, stable, complicated by panic attack disorder, currently being managed slowly with clonazepam 0.5 mg.  Patient has failed therapy in the past with Celexa, bupropion, Zoloft and Prozac.  She is open to referral to psychiatry and psychology.    Plan:  - Referral to psychology/psychiatry         Relevant Medications    clonazePAM (KLONOPIN) 0.5 MG Tab (Start on 9/6/2024)    Other Relevant Orders    Referral to Behavioral Health    Panic disorder     Chronic, stable, managed long-term with clonazepam 0.5 mg daily.  Current treatment plan not ideal or standard of care for anxiety disorder however, patient remains apprehensive to make changes given that she has failed in the past treatment with Celexa, bupropion, Zoloft and Prozac.  Patient open to referral to " psychiatry and psychology for therapy.  Patient understands the significant risks associated with this medication especially in combination with chronic opioid use and now also ongoing treatment with stimulant (phentermine).  Patient wishes to proceed with current treatment plan.    Plan:  - Refill clonazepam 0.5 mg daily  - Referral to psychiatry and psychology         Relevant Medications    clonazePAM (KLONOPIN) 0.5 MG Tab (Start on 9/6/2024)    Other Relevant Orders    Referral to Behavioral Health    Other fatigue     Chronic, improved, stable, etiology undetermined in setting of known history of autoimmune disorder and poorly controlled anxiety.           Relevant Medications    phentermine 30 MG capsule (Start on 11/7/2024)    phentermine 30 MG capsule (Start on 10/7/2024)    phentermine 30 MG capsule (Start on 9/5/2024)    Weight gain, abnormal     BMI 27.84 kg/m², improved since starting on phentermine.  Patient still does not meet diagnostic criteria for obesity or treatment with pharmacotherapy however, patient remains agreeable to accept the potential risks/side effects of proceeding with treatment with phentermine.  Patient remains highly motivated to make dietary and lifestyle modifications.  Ongoing treatment complicated by chronic use of opioid medication and clonazepam.    Plan:  - Refill phentermine 30 mg daily  - Prescribed medication holiday 2 to 4 days  - Continue to encourage healthy lifestyle and dietary choices  - Anticipate discontinuing phentermine after next 90-day of medication treatment         Relevant Medications    phentermine 30 MG capsule (Start on 11/7/2024)    phentermine 30 MG capsule (Start on 10/7/2024)    phentermine 30 MG capsule (Start on 9/5/2024)    Other Relevant Orders    TSH WITH REFLEX TO FT4    HEMOGLOBIN A1C    Skin lesion     3 benign-appearing skin lesions located on right arm, right leg and right side of trunk/flank, all benign-appearing.  Patient has a strong  family history of skin cancers and would like a referral to dermatology to establish care.    Plan:  - Referral to dermatology          Other Visit Diagnoses       Encounter to establish care        Relevant Orders    Referral to Dermatology    Chronic, continuous use of opioids        Relevant Medications    Naloxone (NARCAN) 4 MG/0.1ML Liquid    Preventative health care        Relevant Orders    CBC WITH DIFFERENTIAL    Comp Metabolic Panel    TSH WITH REFLEX TO FT4    Lipid Profile    HEMOGLOBIN A1C    URINALYSIS,CULTURE IF INDICATED            Return in about 3 months (around 11/9/2024).    Tony Umaña M.D.

## 2024-08-14 ENCOUNTER — HOSPITAL ENCOUNTER (EMERGENCY)
Facility: MEDICAL CENTER | Age: 39
End: 2024-08-14
Attending: EMERGENCY MEDICINE
Payer: MEDICAID

## 2024-08-14 VITALS
TEMPERATURE: 97 F | HEIGHT: 70 IN | HEART RATE: 88 BPM | OXYGEN SATURATION: 99 % | SYSTOLIC BLOOD PRESSURE: 130 MMHG | BODY MASS INDEX: 28.09 KG/M2 | WEIGHT: 196.21 LBS | RESPIRATION RATE: 16 BRPM | DIASTOLIC BLOOD PRESSURE: 77 MMHG

## 2024-08-14 DIAGNOSIS — S81.819A LACERATION OF SHIN: ICD-10-CM

## 2024-08-14 DIAGNOSIS — S81.811A SKIN TEAR OF LOWER LEG WITHOUT COMPLICATION, RIGHT, INITIAL ENCOUNTER: ICD-10-CM

## 2024-08-14 PROCEDURE — 304217 HCHG IRRIGATION SYSTEM

## 2024-08-14 PROCEDURE — 700101 HCHG RX REV CODE 250: Performed by: EMERGENCY MEDICINE

## 2024-08-14 PROCEDURE — 99282 EMERGENCY DEPT VISIT SF MDM: CPT

## 2024-08-14 RX ORDER — BACITRACIN ZINC AND POLYMYXIN B SULFATE 500; 1000 [USP'U]/G; [USP'U]/G
OINTMENT TOPICAL ONCE
Status: COMPLETED | OUTPATIENT
Start: 2024-08-14 | End: 2024-08-14

## 2024-08-14 RX ADMIN — FIRST AID ANTIBIOTIC OINTMENT: 500; 10000 OINTMENT TOPICAL at 22:15

## 2024-08-14 ASSESSMENT — FIBROSIS 4 INDEX: FIB4 SCORE: 0.85

## 2024-08-15 NOTE — DISCHARGE INSTRUCTIONS
Keep the wound clean and covered, and when the skin flap dies you should be able to cut it off without any pain because it will not have any sensation.  If you notice any increasing redness or drainage have it seen by a physician to see if he needs antibiotics.

## 2024-08-15 NOTE — ED TRIAGE NOTES
"Chief Complaint   Patient presents with    Laceration     R shin. Reports \"my autistic daughter broke the toilet and I'm not sure what happened. She didn't come into contact with me\". Pt. States she is unsure but thinks that maybe a piece of the toilet cut her.      Physical Exam  Pulmonary:      Effort: Pulmonary effort is normal.   Skin:     General: Skin is warm and dry.   Neurological:      Mental Status: She is alert.           "

## 2024-08-15 NOTE — ED PROVIDER NOTES
"ED Provider Note    CHIEF COMPLAINT  Chief Complaint   Patient presents with    Laceration     R shin. Reports \"my autistic daughter broke the toilet and I'm not sure what happened. She didn't come into contact with me\". Pt. States she is unsure but thinks that maybe a piece of the toilet cut her.            HPI/ROS  LIMITATION TO HISTORY   Select: : None  OUTSIDE HISTORIAN(S):  None    Magui Mallory is a 38 y.o. female who presents to the ER for concern of laceration to the right lower leg.  A broken piece of toilet came into contact and caused a cut, with lots of bleeding.  Bleeding is now controlled.  Tetanus was 3 to 7 years ago she thinks.    PAST MEDICAL HISTORY   has a past medical history of Arthritis, Herpes, Pain, and Psychiatric problem.    SURGICAL HISTORY   has a past surgical history that includes meniscus repair (); lap,diagnostic abdomen (2023); and other orthopedic surgery (Bilateral).    FAMILY HISTORY  History reviewed. No pertinent family history.    SOCIAL HISTORY  Social History     Tobacco Use    Smoking status: Former     Current packs/day: 0.00     Average packs/day: 0.3 packs/day for 23.0 years (5.8 ttl pk-yrs)     Types: Cigarettes     Start date: 1999     Quit date: 2022     Years since quittin.6    Smokeless tobacco: Never   Vaping Use    Vaping status: Never Used   Substance and Sexual Activity    Alcohol use: Yes     Comment: 2/month    Drug use: Yes     Types: Marijuana, Inhaled     Comment: marijuana    Sexual activity: Not Currently     Birth control/protection: Abstinence       CURRENT MEDICATIONS  Home Medications       Reviewed by Love Butt R.N. (Registered Nurse) on 24 at 2117  Med List Status: Not Addressed     Medication Last Dose Status   celecoxib (CELEBREX) 100 MG Cap  Active   clonazePAM (KLONOPIN) 0.5 MG Tab  Active   dicyclomine (BENTYL) 20 MG Tab  Active   lidocaine (XYLOCAINE) 2 % Solution  Active   methocarbamol (ROBAXIN) 750 " "MG Tab  Active   metoclopramide (REGLAN) 10 MG Tab  Active   Multiple Vitamin (MULTIVITAMIN ADULT PO)  Active   Naloxone (NARCAN) 4 MG/0.1ML Liquid  Active   OXYCONTIN 10 MG Tablet Extended Release 12 hour Abuse-Deterrent  Active   phentermine 30 MG capsule  Active   phentermine 30 MG capsule  Active   phentermine 30 MG capsule  Active   SUMAtriptan (IMITREX) 25 MG Tab tablet  Active   valACYclovir (VALTREX) 500 MG Tab  Active                    ALLERGIES  Allergies   Allergen Reactions    Codeine Hives and Itching       PHYSICAL EXAM  VITAL SIGNS: /78   Pulse 93   Temp 36.1 °C (97 °F) (Temporal)   Resp 18   Ht 1.778 m (5' 10\")   Wt 89 kg (196 lb 3.4 oz)   LMP 08/11/2024   SpO2 92%   BMI 28.15 kg/m²    General: Laying in stretcher currently, no distress  Skin: 1 cm partial-thickness, superficial laceration causing a skin tear/flap of the right anterior proximal shin.  No active bleeding.  No foreign body.      COURSE & MEDICAL DECISION MAKING    ASSESSMENT, COURSE AND PLAN  Care Narrative: Laceration, foreign body, abrasion, contusion, fracture,       On arrival patient is well-appearing,.  She has a 1 cm partial-thickness, superficial laceration that is causing a slight skin flap/tear.  Laceration is not deep enough to need repair and the skin is already shrinking, with some necrotic edges is not amenable to repair as this skin is devascularized and will likely fully diet.  Early this information the patient which she was understanding of.  Thus we cleansed the wound and applied antibiotic ointment and a dressing.  Patient was given return precautions and discharged home in stable condition.    DISPOSITION AND DISCUSSIONS  I have discussed management of the patient with the following physicians and ALEIDA's: None    Discussion of management with other QHP or appropriate source(s): None     Escalation of care considered, and ultimately not performed:laceration repair    Barriers to care at this time, " including but not limited to:  None .     Decision tools and prescription drugs considered including, but not limited to:  None .    FINAL DIAGNOSIS  1. Laceration of shin    2. Skin tear of lower leg without complication, right, initial encounter         Electronically signed by: Jamie Galaviz M.D., 8/14/2024 9:52 PM

## 2024-08-15 NOTE — ED NOTES
Tech at bedside cleaning and dressing wound. Pt tolerated well. Discharge instructions given to pt, pt ambulated to exit without assist

## 2024-10-01 DIAGNOSIS — F41.0 PANIC DISORDER: ICD-10-CM

## 2024-10-01 DIAGNOSIS — F41.9 ANXIETY: ICD-10-CM

## 2024-10-01 RX ORDER — CLONAZEPAM 0.5 MG/1
0.5 TABLET ORAL
Qty: 30 TABLET | Refills: 0 | Status: SHIPPED | OUTPATIENT
Start: 2024-10-07 | End: 2024-10-15 | Stop reason: SDUPTHER

## 2024-10-15 ENCOUNTER — APPOINTMENT (OUTPATIENT)
Dept: MEDICAL GROUP | Facility: CLINIC | Age: 39
End: 2024-10-15
Payer: MEDICAID

## 2024-10-15 VITALS
SYSTOLIC BLOOD PRESSURE: 130 MMHG | HEIGHT: 70 IN | OXYGEN SATURATION: 99 % | HEART RATE: 73 BPM | BODY MASS INDEX: 28.92 KG/M2 | RESPIRATION RATE: 16 BRPM | WEIGHT: 202 LBS | DIASTOLIC BLOOD PRESSURE: 89 MMHG

## 2024-10-15 DIAGNOSIS — R63.5 WEIGHT GAIN, ABNORMAL: ICD-10-CM

## 2024-10-15 DIAGNOSIS — F41.0 PANIC DISORDER: ICD-10-CM

## 2024-10-15 DIAGNOSIS — F41.9 ANXIETY: ICD-10-CM

## 2024-10-15 DIAGNOSIS — M25.552 PAIN IN JOINT INVOLVING LEFT PELVIC REGION AND THIGH: ICD-10-CM

## 2024-10-15 DIAGNOSIS — M24.152 DEGENERATIVE TEAR OF ACETABULAR LABRUM OF LEFT HIP: ICD-10-CM

## 2024-10-15 PROCEDURE — 3075F SYST BP GE 130 - 139MM HG: CPT

## 2024-10-15 PROCEDURE — 99213 OFFICE O/P EST LOW 20 MIN: CPT | Mod: GE

## 2024-10-15 PROCEDURE — 3079F DIAST BP 80-89 MM HG: CPT

## 2024-10-15 RX ORDER — CLONAZEPAM 0.5 MG/1
0.5 TABLET ORAL
Qty: 30 TABLET | Refills: 0 | Status: SHIPPED | OUTPATIENT
Start: 2024-11-15 | End: 2024-12-15

## 2024-10-15 RX ORDER — KETOROLAC TROMETHAMINE 30 MG/ML
30 INJECTION, SOLUTION INTRAMUSCULAR; INTRAVENOUS ONCE
Status: DISCONTINUED | OUTPATIENT
Start: 2024-10-15 | End: 2024-10-15

## 2024-10-15 RX ORDER — MORPHINE SULFATE 15 MG/1
15 TABLET ORAL 2 TIMES DAILY
COMMUNITY

## 2024-10-15 RX ORDER — CLONAZEPAM 0.5 MG/1
0.5 TABLET ORAL
Qty: 30 TABLET | Refills: 0 | Status: SHIPPED | OUTPATIENT
Start: 2024-10-15 | End: 2024-11-14

## 2024-10-15 RX ORDER — CLONAZEPAM 0.5 MG/1
0.5 TABLET ORAL
Qty: 30 TABLET | Refills: 0 | Status: SHIPPED | OUTPATIENT
Start: 2024-12-15 | End: 2025-01-14

## 2024-10-15 ASSESSMENT — FIBROSIS 4 INDEX: FIB4 SCORE: 0.85

## 2025-01-13 DIAGNOSIS — F41.0 PANIC DISORDER: ICD-10-CM

## 2025-01-13 RX ORDER — CLONAZEPAM 0.5 MG/1
0.5 TABLET ORAL
Qty: 15 TABLET | Refills: 0 | Status: SHIPPED | OUTPATIENT
Start: 2025-01-13 | End: 2025-02-12

## 2025-01-17 DIAGNOSIS — F41.9 ANXIETY: ICD-10-CM

## 2025-01-17 DIAGNOSIS — F32.A DEPRESSION, UNSPECIFIED DEPRESSION TYPE: ICD-10-CM

## 2025-01-17 RX ORDER — SERTRALINE HYDROCHLORIDE 25 MG/1
25 TABLET, FILM COATED ORAL DAILY
Qty: 90 TABLET | Refills: 0 | Status: SHIPPED | OUTPATIENT
Start: 2025-01-17

## 2025-02-04 ENCOUNTER — APPOINTMENT (OUTPATIENT)
Dept: MEDICAL GROUP | Facility: CLINIC | Age: 40
End: 2025-02-04
Payer: MEDICAID

## 2025-02-04 VITALS
SYSTOLIC BLOOD PRESSURE: 112 MMHG | HEART RATE: 82 BPM | DIASTOLIC BLOOD PRESSURE: 74 MMHG | OXYGEN SATURATION: 97 % | WEIGHT: 193 LBS | BODY MASS INDEX: 27.69 KG/M2 | TEMPERATURE: 98 F

## 2025-02-04 DIAGNOSIS — F41.9 ANXIETY: ICD-10-CM

## 2025-02-04 DIAGNOSIS — F41.0 PANIC DISORDER: ICD-10-CM

## 2025-02-04 DIAGNOSIS — F32.A DEPRESSION, UNSPECIFIED DEPRESSION TYPE: ICD-10-CM

## 2025-02-04 PROCEDURE — 3078F DIAST BP <80 MM HG: CPT | Mod: GC

## 2025-02-04 PROCEDURE — 3074F SYST BP LT 130 MM HG: CPT | Mod: GC

## 2025-02-04 PROCEDURE — 99213 OFFICE O/P EST LOW 20 MIN: CPT | Mod: GE

## 2025-02-04 RX ORDER — SERTRALINE HYDROCHLORIDE 25 MG/1
50 TABLET, FILM COATED ORAL DAILY
Qty: 90 TABLET | Refills: 0 | Status: SHIPPED | OUTPATIENT
Start: 2025-02-04 | End: 2025-02-14

## 2025-02-04 RX ORDER — CLONAZEPAM 0.5 MG/1
0.5 TABLET ORAL
Qty: 15 TABLET | Refills: 0 | Status: SHIPPED | OUTPATIENT
Start: 2025-02-13 | End: 2025-02-14 | Stop reason: DRUGHIGH

## 2025-02-04 RX ORDER — CLONAZEPAM 0.5 MG/1
0.5 TABLET ORAL
Qty: 15 TABLET | Refills: 0 | Status: SHIPPED | OUTPATIENT
Start: 2025-04-16 | End: 2025-02-14

## 2025-02-04 RX ORDER — CLONAZEPAM 0.5 MG/1
0.5 TABLET ORAL
Qty: 15 TABLET | Refills: 0 | Status: SHIPPED | OUTPATIENT
Start: 2025-03-16 | End: 2025-02-14

## 2025-02-04 ASSESSMENT — PATIENT HEALTH QUESTIONNAIRE - PHQ9: CLINICAL INTERPRETATION OF PHQ2 SCORE: 0

## 2025-02-04 ASSESSMENT — FIBROSIS 4 INDEX: FIB4 SCORE: 0.87

## 2025-02-04 NOTE — ASSESSMENT & PLAN NOTE
Chronic, moderate to severe, complicated by panic attack disorder.  Historically, patient manages her symptoms with as needed Klonopin however, since her last visit, she is agreeable to start SSRI treatment in efforts to decrease as needed benzo use.    Plan:  - Continue Zoloft, increased to 50 mg daily.  Anticipate we will likely need to increase to 100 mg daily.

## 2025-02-04 NOTE — ASSESSMENT & PLAN NOTE
Mild, recurrent, previously responded well to treatment with Zoloft and patient is agreeable to continue to titrate up current dosing.  We will increase Zoloft to 50 mg daily.    Plan:  - Increase Zoloft to 50 mg daily  - Referral to behavioral health for therapy

## 2025-02-04 NOTE — PROGRESS NOTES
Subjective:     CC:   Chief Complaint   Patient presents with    Follow-Up       HPI:   Magui presents today for follow-up on depression and medication refill.  Patient was started on Zoloft 25 mg approximately 2 weeks ago for depression and has since noted no improvement.  Patient has been on Zoloft in the past with good response and is agreeable to increase dosing at this time.  She denies any suicidal or homicidal ideations.  Additionally, patient would like to have her Klonopin refilled.  This is a chronic medication 0.5 mg as needed for panic attacks.  Patient has been gradually weaned down from 30 tablets monthly to 15 and we will continue to work on decreasing this amount.  Of note, I was previously working with this patient regarding weight loss management with pharmacotherapy however, due to recurrent denials from insurance,.  Is now obtaining semaglutide through an Internet provider.  She since reports approximately 8 pounds of weight loss and is overall happy with her treatment.      Current Outpatient Medications Ordered in Epic   Medication Sig Dispense Refill    sertraline (ZOLOFT) 25 MG tablet Take 2 Tablets by mouth every day. 90 Tablet 0    [START ON 2/13/2025] clonazePAM (KLONOPIN) 0.5 MG Tab Take 1 Tablet by mouth 1 time a day as needed (Panic attacks) for up to 30 days. 15 Tablet 0    [START ON 3/16/2025] clonazePAM (KLONOPIN) 0.5 MG Tab Take 1 Tablet by mouth 1 time a day as needed (Panic attacks) for up to 30 days. 15 Tablet 0    [START ON 4/16/2025] clonazePAM (KLONOPIN) 0.5 MG Tab Take 1 Tablet by mouth 1 time a day as needed (Panic attacks) for up to 30 days. 15 Tablet 0    morphine (MS IR) 15 MG tablet Take 15 mg by mouth 2 times a day.      Semaglutide (WEGOVY) 0.5 MG/0.5ML Solution Auto-injector Pen-injector Inject 0.5 mL under the skin every 7 days. 1 Each 3    Naloxone (NARCAN) 4 MG/0.1ML Liquid One spray in one nostril for overdose and call 911. 1 Each 1    SUMAtriptan (IMITREX) 25  MG Tab tablet TAKE 1 TO 4 TABLETS BY MOUTH ONE TIME AS NEEDED FOR MIGRAINE. 9 Tablet 2    valACYclovir (VALTREX) 500 MG Tab TAKE 1 TABLET BY MOUTH TWICE A DAY 60 Tablet 1    Multiple Vitamin (MULTIVITAMIN ADULT PO) Take 1 Tablet by mouth every day.      celecoxib (CELEBREX) 100 MG Cap        No current Epic-ordered facility-administered medications on file.       ROS:  Negative except for above in HPI    Objective:     Exam:  /74 (BP Location: Right arm, Patient Position: Sitting, BP Cuff Size: Adult)   Pulse 82   Temp 36.7 °C (98 °F)   Wt 87.5 kg (193 lb)   SpO2 97%   BMI 27.69 kg/m²  Body mass index is 27.69 kg/m².    Gen: Pleasant, well-appearing, no acute distress  HEENT: NCAT, MMM, No lymphadenopathy  Lungs: Normal effort, CTA bilaterally, no wheezes, rhonchi, or rales  CV: Regular rate and rhythm. No murmurs, rubs, or gallops. Radial pulses palpable bilat  Abd: Soft, non-distended, no guarding, no rebound, non-tender to palpation  Ext: No clubbing, cyanosis, edema.  Neuro: Non-focal  Psych: Appropriate mood and affect, no SI or HI      Assessment & Plan:     39 y.o. female with the following -     Problem List Items Addressed This Visit       Anxiety     Chronic, moderate to severe, complicated by panic attack disorder.  Historically, patient manages her symptoms with as needed Klonopin however, since her last visit, she is agreeable to start SSRI treatment in efforts to decrease as needed benzo use.    Plan:  - Continue Zoloft, increased to 50 mg daily.  Anticipate we will likely need to increase to 100 mg daily.         Relevant Medications    sertraline (ZOLOFT) 25 MG tablet    Other Relevant Orders    Referral to Behavioral Health    Panic disorder     Chronic, moderate, intermittent episodes which have been decreasing in frequency.  Historically, patient has been managing her symptoms with Klonopin 0.5 mg as needed.  Patient is now down to 15 tablets monthly and has since started on Zoloft in  efforts to better manage her anxiety and depression.    Plan:  - Refill Klonopin 0.5 mg as needed for panic attacks x 15 tablets, with 3 refills  -PDMP reviewed         Relevant Medications    sertraline (ZOLOFT) 25 MG tablet    clonazePAM (KLONOPIN) 0.5 MG Tab (Start on 2/13/2025)    clonazePAM (KLONOPIN) 0.5 MG Tab (Start on 3/16/2025)    clonazePAM (KLONOPIN) 0.5 MG Tab (Start on 4/16/2025)    Depression     Mild, recurrent, previously responded well to treatment with Zoloft and patient is agreeable to continue to titrate up current dosing.  We will increase Zoloft to 50 mg daily.    Plan:  - Increase Zoloft to 50 mg daily  - Referral to behavioral health for therapy         Relevant Medications    sertraline (ZOLOFT) 25 MG tablet    Other Relevant Orders    Referral to Behavioral Health          Return in about 3 months (around 5/4/2025), or if symptoms worsen or fail to improve.    Tony Umaña M.D.

## 2025-02-04 NOTE — ASSESSMENT & PLAN NOTE
Chronic, moderate, intermittent episodes which have been decreasing in frequency.  Historically, patient has been managing her symptoms with Klonopin 0.5 mg as needed.  Patient is now down to 15 tablets monthly and has since started on Zoloft in efforts to better manage her anxiety and depression.    Plan:  - Refill Klonopin 0.5 mg as needed for panic attacks x 15 tablets, with 3 refills  -PDMP reviewed

## 2025-02-14 DIAGNOSIS — F41.9 ANXIETY: ICD-10-CM

## 2025-02-14 DIAGNOSIS — F41.0 PANIC DISORDER: ICD-10-CM

## 2025-02-14 RX ORDER — CLONAZEPAM 0.5 MG/1
0.5 TABLET ORAL
Qty: 30 TABLET | Refills: 0 | Status: SHIPPED | OUTPATIENT
Start: 2025-04-16 | End: 2025-05-16

## 2025-02-14 RX ORDER — CLONAZEPAM 0.5 MG/1
0.5 TABLET ORAL
Qty: 30 TABLET | Refills: 0 | Status: SHIPPED | OUTPATIENT
Start: 2025-03-16 | End: 2025-04-15

## 2025-02-14 RX ORDER — CLONAZEPAM 0.5 MG/1
0.5 TABLET ORAL
Qty: 30 TABLET | Refills: 0 | Status: SHIPPED | OUTPATIENT
Start: 2025-02-14 | End: 2025-03-16

## 2025-03-07 DIAGNOSIS — F41.0 PANIC DISORDER: ICD-10-CM

## 2025-03-07 RX ORDER — CLONAZEPAM 0.5 MG/1
0.5 TABLET ORAL
Qty: 30 TABLET | Refills: 0 | Status: SHIPPED | OUTPATIENT
Start: 2025-03-07 | End: 2025-04-06

## 2025-04-10 DIAGNOSIS — K92.1 MELENA: ICD-10-CM

## 2025-04-10 DIAGNOSIS — Z87.19 HISTORY OF GASTRITIS: ICD-10-CM

## 2025-04-10 RX ORDER — OMEPRAZOLE 40 MG/1
40 CAPSULE, DELAYED RELEASE ORAL DAILY
Qty: 90 CAPSULE | Refills: 0 | Status: SHIPPED | OUTPATIENT
Start: 2025-04-10

## 2025-04-22 NOTE — Clinical Note
REFERRAL APPROVAL NOTICE         Sent on April 22, 2025                   Magui Mallory  [address]  [place] [address]                   Dear Ms. Mallory,    After a careful review of the medical information and benefit coverage, [facility] has processed your referral. See below for additional details.    If applicable, you must be actively enrolled with your insurance for coverage of the authorized service. If you have any questions regarding your coverage, please contact your insurance directly.    REFERRAL INFORMATION   Referral #:  [id number]  Referred-To Provider    Referred-By Provider:  Gastroenterology    Tony Umaña M.D.   [facility]      [address]  [place] [address]  [phone] [address] [facility]  [place] [address]  [phone]    Referral Start Date:  04/10/2025  Referral End Date:   04/10/2026             SCHEDULING  If you do not already have an appointment, please call [phone] to make an appointment.     MORE INFORMATION  If you do not already have a [employer] account, sign up at: [email or web address]  You can access your medical information, make appointments, see lab results, billing information, and more.  If you have questions regarding this referral, please contact  the [facility] Referrals department at:             [phone]. Monday - Friday 8:00AM - 5:00PM.     Sincerely,    [facility]

## 2025-05-14 ENCOUNTER — HOSPITAL ENCOUNTER (EMERGENCY)
Facility: MEDICAL CENTER | Age: 40
End: 2025-05-14
Attending: EMERGENCY MEDICINE
Payer: MEDICAID

## 2025-05-14 ENCOUNTER — APPOINTMENT (OUTPATIENT)
Dept: RADIOLOGY | Facility: MEDICAL CENTER | Age: 40
End: 2025-05-14
Attending: EMERGENCY MEDICINE
Payer: MEDICAID

## 2025-05-14 VITALS
HEART RATE: 89 BPM | HEIGHT: 70 IN | DIASTOLIC BLOOD PRESSURE: 99 MMHG | OXYGEN SATURATION: 97 % | TEMPERATURE: 97 F | WEIGHT: 170 LBS | SYSTOLIC BLOOD PRESSURE: 135 MMHG | BODY MASS INDEX: 24.34 KG/M2 | RESPIRATION RATE: 18 BRPM

## 2025-05-14 DIAGNOSIS — J34.89 SINUS PAIN: ICD-10-CM

## 2025-05-14 DIAGNOSIS — J34.89 PERFORATED NASAL SEPTUM: Primary | ICD-10-CM

## 2025-05-14 LAB
ALBUMIN SERPL BCP-MCNC: 3.9 G/DL (ref 3.2–4.9)
ALBUMIN/GLOB SERPL: 1.2 G/DL
ALP SERPL-CCNC: 69 U/L (ref 30–99)
ALT SERPL-CCNC: 6 U/L (ref 2–50)
ANION GAP SERPL CALC-SCNC: 10 MMOL/L (ref 7–16)
AST SERPL-CCNC: 20 U/L (ref 12–45)
BASOPHILS # BLD AUTO: 0.4 % (ref 0–1.8)
BASOPHILS # BLD: 0.03 K/UL (ref 0–0.12)
BILIRUB SERPL-MCNC: 0.2 MG/DL (ref 0.1–1.5)
BUN SERPL-MCNC: 5 MG/DL (ref 8–22)
CALCIUM ALBUM COR SERPL-MCNC: 9.4 MG/DL (ref 8.5–10.5)
CALCIUM SERPL-MCNC: 9.3 MG/DL (ref 8.5–10.5)
CHLORIDE SERPL-SCNC: 105 MMOL/L (ref 96–112)
CO2 SERPL-SCNC: 24 MMOL/L (ref 20–33)
CREAT SERPL-MCNC: 0.85 MG/DL (ref 0.5–1.4)
EOSINOPHIL # BLD AUTO: 0.07 K/UL (ref 0–0.51)
EOSINOPHIL NFR BLD: 0.9 % (ref 0–6.9)
ERYTHROCYTE [DISTWIDTH] IN BLOOD BY AUTOMATED COUNT: 43.2 FL (ref 35.9–50)
GFR SERPLBLD CREATININE-BSD FMLA CKD-EPI: 88 ML/MIN/1.73 M 2
GLOBULIN SER CALC-MCNC: 3.2 G/DL (ref 1.9–3.5)
GLUCOSE SERPL-MCNC: 90 MG/DL (ref 65–99)
HCT VFR BLD AUTO: 38.7 % (ref 37–47)
HGB BLD-MCNC: 12.7 G/DL (ref 12–16)
IMM GRANULOCYTES # BLD AUTO: 0.02 K/UL (ref 0–0.11)
IMM GRANULOCYTES NFR BLD AUTO: 0.3 % (ref 0–0.9)
LYMPHOCYTES # BLD AUTO: 1.74 K/UL (ref 1–4.8)
LYMPHOCYTES NFR BLD: 23.2 % (ref 22–41)
MCH RBC QN AUTO: 30.5 PG (ref 27–33)
MCHC RBC AUTO-ENTMCNC: 32.8 G/DL (ref 32.2–35.5)
MCV RBC AUTO: 93 FL (ref 81.4–97.8)
MONOCYTES # BLD AUTO: 0.52 K/UL (ref 0–0.85)
MONOCYTES NFR BLD AUTO: 6.9 % (ref 0–13.4)
NEUTROPHILS # BLD AUTO: 5.11 K/UL (ref 1.82–7.42)
NEUTROPHILS NFR BLD: 68.3 % (ref 44–72)
NRBC # BLD AUTO: 0 K/UL
NRBC BLD-RTO: 0 /100 WBC (ref 0–0.2)
PLATELET # BLD AUTO: 324 K/UL (ref 164–446)
PMV BLD AUTO: 9.6 FL (ref 9–12.9)
POTASSIUM SERPL-SCNC: 4 MMOL/L (ref 3.6–5.5)
PROT SERPL-MCNC: 7.1 G/DL (ref 6–8.2)
RBC # BLD AUTO: 4.16 M/UL (ref 4.2–5.4)
SODIUM SERPL-SCNC: 139 MMOL/L (ref 135–145)
WBC # BLD AUTO: 7.5 K/UL (ref 4.8–10.8)

## 2025-05-14 PROCEDURE — 85025 COMPLETE CBC W/AUTO DIFF WBC: CPT

## 2025-05-14 PROCEDURE — 70487 CT MAXILLOFACIAL W/DYE: CPT

## 2025-05-14 PROCEDURE — 36415 COLL VENOUS BLD VENIPUNCTURE: CPT

## 2025-05-14 PROCEDURE — 96374 THER/PROPH/DIAG INJ IV PUSH: CPT | Mod: XU

## 2025-05-14 PROCEDURE — 700111 HCHG RX REV CODE 636 W/ 250 OVERRIDE (IP): Mod: JZ | Performed by: EMERGENCY MEDICINE

## 2025-05-14 PROCEDURE — 80053 COMPREHEN METABOLIC PANEL: CPT

## 2025-05-14 PROCEDURE — 700117 HCHG RX CONTRAST REV CODE 255: Performed by: EMERGENCY MEDICINE

## 2025-05-14 PROCEDURE — 99284 EMERGENCY DEPT VISIT MOD MDM: CPT

## 2025-05-14 RX ORDER — KETOROLAC TROMETHAMINE 15 MG/ML
15 INJECTION, SOLUTION INTRAMUSCULAR; INTRAVENOUS ONCE
Status: COMPLETED | OUTPATIENT
Start: 2025-05-14 | End: 2025-05-14

## 2025-05-14 RX ADMIN — KETOROLAC TROMETHAMINE 15 MG: 15 INJECTION, SOLUTION INTRAMUSCULAR; INTRAVENOUS at 18:16

## 2025-05-14 RX ADMIN — IOHEXOL 80 ML: 350 INJECTION, SOLUTION INTRAVENOUS at 19:15

## 2025-05-14 ASSESSMENT — PAIN DESCRIPTION - PAIN TYPE
TYPE: ACUTE PAIN

## 2025-05-14 NOTE — ED PROVIDER NOTES
ED Provider Note      ED PHYSICIAN NOTE      CHIEF COMPLAINT  Chief Complaint   Patient presents with    Sinus Pain     Pt reports chronic sinus infection that has been worsening x 2 months. Pt reports PCP is worried for fungal infection and referred pt to ED for further eval.        EXTERNAL RECORDS REVIEWED  Outpatient Notes from [facility] with noted bilateral hip joint pain lumbar spondylolisthesis, there are additional patient messages noted from [facility] May 9    HPI/ROS  LIMITATION TO HISTORY   Select: : None  OUTSIDE HISTORIAN(S):  none    Magui Mallory is a 39 y.o. female who presents with nasal and facial pain.  Patient reports that she has had this pain for 2 months gradually worsening.  She reports yellow drainage as well.  She has been doing multiple rinses and symptoms have been worsening.  She reports primarily facial pain that is going down into her forehead and teeth as well.  Does have some headache but mainly the facial pain.  No visual symptoms or visual changes.  No fevers.  No neck pain.  No focal weakness or numbness.  No history of diabetes, steroid use or immunocompromise.    She used a nasal camera that she got from [employer] and sent pictures to her primary care with concern for fungal infection  PAST MEDICAL HISTORY  Past Medical History[1]    SOCIAL HISTORY  Social History[2]    CURRENT MEDICATIONS  Home Medications       Reviewed by Clarisse Kyle R.N. (Registered Nurse) on 05/14/25 at 1520  Med List Status: Not Addressed     Medication Last Dose Status   celecoxib (CELEBREX) 100 MG Cap  Active   clonazePAM (KLONOPIN) 0.5 MG Tab  Active   morphine (MS IR) 15 MG tablet  Active   Multiple Vitamin (MULTIVITAMIN ADULT PO)  Active   Naloxone (NARCAN) 4 MG/0.1ML Liquid  Active   omeprazole (PRILOSEC) 40 MG delayed-release capsule  Active   Semaglutide (WEGOVY) 0.5 MG/0.5ML Solution Auto-injector Pen-injector  Active   sertraline (ZOLOFT) 50 MG Tab  Active   SUMAtriptan  "(IMITREX) 25 MG Tab tablet  Active   valACYclovir (VALTREX) 500 MG Tab  Active                  Audit from Redirected Encounters    **Home medications have not yet been reviewed for this encounter**         ALLERGIES  Allergies[3]    PHYSICAL EXAM  VITAL SIGNS: BP (!) 135/99   Pulse 89   Temp 36.1 °C (97 °F) (Temporal)   Resp 18   Ht 1.778 m (5' 10\")   Wt 77.1 kg (170 lb)   SpO2 97%   BMI 24.39 kg/m²    Constitutional: Awake and alert   HENT: Normal inspection, no signs of trauma, there is tenderness over the maxillary sinuses bilaterally, no erythema or significant swelling noted, patient is currently in [facility], will look further in her nares once she is placed in her room with equipment  Eyes: Normal inspection, Pupils equal, non-icteric  Neck: Grossly normal range of motion. No stridor  Cardiovascular: Regular rate and rhythm, no murmurs.   Thorax & Lungs: No respiratory distress, No wheezing, No rales, No rhonchi, No chest tenderness.   Abdomen:  soft, non-distended, nontender, no mass  Skin: No obvious rash. Warm. Dry.   Back: No tenderness, No CVA tenderness.   Extremities: No cyanosis, no edema  Neurologic: AO3, cranial nerves are intact, there is no nystagmus, no pain or diplopia with extraocular vision grossly normal,   Psychiatric: Normal affect for situation        DIAGNOSTIC STUDIES / PROCEDURES  LABS/EKG  Labs Reviewed   CBC WITH DIFFERENTIAL - Abnormal; Notable for the following components:       Result Value    RBC 4.16 (*)     All other components within normal limits   COMP METABOLIC PANEL - Abnormal; Notable for the following components:    Bun 5 (*)     All other components within normal limits   ESTIMATED GFR       I have independently interpreted this EKG as documented above      RADIOLOGY  I have independently interpreted the diagnostic imaging associated with this visit and am waiting the final reading from the radiologist.   My preliminary interpretation is as follows: Cyst in the " right sinus maxilla    CT-MAXILLOFACIAL WITH PLUS RECONS   Final Result      1.  Very mild sinus disease.   2.  Mildly enlarged left cervical lymph node.            COURSE & MEDICAL DECISION MAKING    INITIAL ASSESSMENT, COURSE AND PLAN  Care Narrative: 3:54 PM  Patient presents with sinus pain.  Consideration for chronic sinusitis, consideration for bacterial cause, fungal, consideration for mass.  She has no meningeal findings, no fever here or findings that would suggest sepsis or systemic involvement.  Will order for CT maxillofacial of the sinuses diagnostic labs    Case is discussed with Dr. May from ENT.  She has reviewed both the pictures in the patient's chart as well as the CT.  Notable for perforated septum.  No findings to suggest significant sinusitis.  Will refer the patient for follow-up in the clinic, no interventions or occasions needed at this time    Patient is reevaluated and updated on all results, she is comfortable with the discharge plan    Interventions  Medications   ketorolac (Toradol) 15 MG/ML injection 15 mg (15 mg Intravenous Given 5/14/25 1816)   iohexol (OMNIPAQUE) 350 mg/mL (IV) (80 mL Intravenous Given 5/14/25 1915)          PROBLEM LIST  This is a pleasant 39-year-old female presenting with    # Sinus pain and drainage.  CT was obtained and there is no evidence of invasive disease, no findings of deep space infection.  She is overall well-appearing, afebrile.  Case was discussed with ENT as above and she will follow-up in the clinic    # Perforated septum.  Referral was placed for follow-up with ENT      DISPOSITION AND DISCUSSIONS  Barriers to care at this time, including but not limited to: none.     Prescription drugs considered and/or prescribed:   Considered opiate prescription, but nonnarcotic analgesic is most appropriate  Prescribed   New Prescriptions    No medications on file       The patient will return for new or worsening symptoms and is stable at the time of  discharge.    The patient is referred to a primary physician for blood pressure management, diabetic screening, and for all other preventative health concerns.      DISPOSITION:  Patient will be discharged home in stable condition.    FOLLOW UP:  Sonia May M.D.  [address] [place] [id number]  [phone]    Schedule an appointment as soon as possible for a visit         OUTPATIENT MEDICATIONS:  New Prescriptions    No medications on file         FINAL DIAGNOSIS  1. Perforated nasal septum  Referral to ENT      2. Sinus pain  Referral to ENT             This dictation was created using voice recognition software. The accuracy of the dictation is limited to the abilities of the software. I expect there may be some errors of grammar and possibly content. The nursing notes were reviewed and certain aspects of this information were incorporated into this note.    Electronically signed by: Eron Kruse M.D., 5/14/2025            [1]   Past Medical History:  Diagnosis Date    Arthritis     osteo, hands,wrist,hips,knees    Herpes     Pain     hips, sacrum back, shoulders, knees    Psychiatric problem     anxiety   [2]   Social History  Tobacco Use    Smoking status: Former     Current packs/day: 0.00     Average packs/day: 0.3 packs/day for 23.0 years (5.8 ttl pk-yrs)     Types: Cigarettes     Start date: 1/1/1999     Quit date: 1/1/2022     Years since quitting: 3.3    Smokeless tobacco: Never   Vaping Use    Vaping status: Never Used   Substance Use Topics    Alcohol use: Yes     Comment: 2/month    Drug use: Yes     Types: Marijuana, Inhaled     Comment: marijuana   [3]   Allergies  Allergen Reactions    Codeine Hives and Itching

## 2025-05-14 NOTE — ED TRIAGE NOTES
"Chief Complaint   Patient presents with    Sinus Pain     Pt reports chronic sinus infection that has been worsening x 2 months. Pt reports PCP is worried for fungal infection and referred pt to ED for further eval.        38 yo F to triage for above complaint.      Pt placed in lobby. Pt educated on triage process. Pt encouraged to alert staff for any changes.     Patient and staff wearing appropriate PPE    /85   Pulse (!) 109   Temp 36.1 °C (97 °F) (Temporal)   Resp 18   Ht 1.778 m (5' 10\")   Wt 77.1 kg (170 lb)   SpO2 99%   BMI 24.39 kg/m²     "

## 2025-05-15 NOTE — ED NOTES
Patient discharged home per ERP  Discharge teaching and education discussed with patient.  Patient verbalized understanding of discharge teaching and education. No other questions at this time.  PIV removed.    VSS. Patient alert and oriented.  Patient's ride arranged by self  Able to ambulate off unit safely with steady gait.  All belongings with patient at time of discharge.

## 2025-05-15 NOTE — DISCHARGE INSTRUCTIONS
Your CT scan does not show significant sinusitis.  You do have a perforated septum.  A referral has been placed for follow-up with an ENT specialist so please call their office as above to schedule this.  In the meantime you can do sinus rinses but avoid Afrin or any other products in your nose

## 2025-05-16 NOTE — Clinical Note
REFERRAL APPROVAL NOTICE         Sent on May 16, 2025                   Magui Mallory  [address]  [place] [address]                   Dear Ms. Mallory,    After a careful review of the medical information and benefit coverage, [facility] has processed your referral. See below for additional details.    If applicable, you must be actively enrolled with your insurance for coverage of the authorized service. If you have any questions regarding your coverage, please contact your insurance directly.    REFERRAL INFORMATION   Referral #:  [id number]  Referred-To Provider    Referred-By Provider:  Otolaryngology    Eron Kruse M.D.   CITLALI THOMPSON MD [facility]      [address] [facility]  [address]  [place] [address]  [phone] [address] [address]  [phone]    Referral Start Date:  05/14/2025  Referral End Date:   05/14/2026             SCHEDULING  If you do not already have an appointment, please call [phone] to make an appointment.     MORE INFORMATION  If you do not already have a [employer] account, sign up at: [email or web address]  You can access your medical information, make appointments, see lab results, billing information, and more.  If you have questions regarding this referral, please contact  the [facility] Referrals department at:             [phone]. Monday - Friday 8:00AM - 5:00PM.     Sincerely,    [facility]

## 2025-05-26 DIAGNOSIS — J34.89 NASAL DISCOMFORT: Primary | ICD-10-CM

## 2025-06-05 ENCOUNTER — APPOINTMENT (OUTPATIENT)
Dept: MEDICAL GROUP | Facility: CLINIC | Age: 40
End: 2025-06-05
Payer: MEDICAID

## 2025-06-05 VITALS
WEIGHT: 168 LBS | SYSTOLIC BLOOD PRESSURE: 110 MMHG | OXYGEN SATURATION: 96 % | TEMPERATURE: 98.6 F | DIASTOLIC BLOOD PRESSURE: 70 MMHG | HEIGHT: 68 IN | BODY MASS INDEX: 25.46 KG/M2 | HEART RATE: 93 BPM

## 2025-06-05 DIAGNOSIS — F41.0 PANIC DISORDER: ICD-10-CM

## 2025-06-05 DIAGNOSIS — L98.9 SKIN LESION: ICD-10-CM

## 2025-06-05 DIAGNOSIS — Z00.00 ENCOUNTER FOR MEDICAL EXAMINATION TO ESTABLISH CARE: Primary | ICD-10-CM

## 2025-06-05 DIAGNOSIS — F41.9 ANXIETY: ICD-10-CM

## 2025-06-05 PROCEDURE — 3074F SYST BP LT 130 MM HG: CPT | Mod: GC

## 2025-06-05 PROCEDURE — 99214 OFFICE O/P EST MOD 30 MIN: CPT | Mod: GC

## 2025-06-05 PROCEDURE — 3078F DIAST BP <80 MM HG: CPT | Mod: GC

## 2025-06-05 RX ORDER — CLONAZEPAM 0.5 MG/1
0.5 TABLET ORAL
Qty: 30 TABLET | Refills: 0 | Status: SHIPPED | OUTPATIENT
Start: 2025-06-05 | End: 2025-07-05

## 2025-06-05 ASSESSMENT — FIBROSIS 4 INDEX: FIB4 SCORE: 1.01

## 2025-06-05 NOTE — ASSESSMENT & PLAN NOTE
Chronic, moderate, patient experiences panic attacks up to 3 times per month however, sometimes goes up to 3 months without having a panic attack.  Patient continues to tolerate Klonopin as needed as needed for her panic attacks.    Plan:  - Refill Klonopin 0.5 mg x 30 tablets, no refills  - Continue to treat anxiety

## 2025-06-05 NOTE — ASSESSMENT & PLAN NOTE
Chronic, stable, well-controlled on Zoloft 50 mg daily.  Patient needs medication refill at this time.    Plan:  - Refill Zoloft 50 mg daily

## 2025-06-05 NOTE — ASSESSMENT & PLAN NOTE
Suspicious mid axillary lesion on the right side, approximately 0.25 cm in diameter, with inferior border beginning to look distorted.  Patient has no prior history of skin cancers.  She would also like to establish with a dermatologist for a head to toe exam.    Plan:  - Refer to dermatology

## 2025-06-05 NOTE — PROGRESS NOTES
Subjective:     CC:   Chief Complaint   Patient presents with    Medication Refill    Nausea       HPI:   Magui presents today to discuss multiple items:  1.  Medication refill: Patient would like to have medication refill for her Zoloft 50 mg daily and Klonopin 0.5 mg as needed for panic attacks.  The patient continues to tolerate both of these medications well and she has no concerns regarding their use.  Patient experiences up to 3 panic attacks monthly but sometimes goes several months without having a panic attack.  A 30-day supplies is adequate at this time.  2.  Sinuses: The patient continues to have ongoing symptoms with her nose/sinuses.  Previously, patient had submitted a picture via [employer] noting darkening of the nasal mucosa which was very concerning and the patient was informed to present to the ED at that time.  In the ED, a CT maxillofacial revealed no significant findings concerning for acute infection.  Since the patient is still having symptoms, a referral to ENT was placed and she has an established appointment next month.  We discussed strict return precautions.  3.  Nausea/vomiting: The patient has a history of cannabinoid induced hyperemesis and had been avoiding smoking marijuana however, recently, she once again smoked and experienced again severe episodes of vomiting that only seem to improve with hot showers/baths.  The patient understands that this is likely an immediate secondary effect of smoking marijuana.  She is agreeable to abstain from using marijuana in the future.  4.  Dermatology referral: The patient would like to have a dermatology referral placed to establish care for a complete skin check.  She does have 1 lesion of concern on her right flank.  She notes that it recently darkened and she is concerned that it might be growing.  The patient is not interested in having it removed unless it is absolutely necessary and therefore would like to see a dermatologist.    Problem  "  Skin Lesion   Panic Disorder   Anxiety       Current Medications and Prescriptions Ordered in Epic[1]    ROS:  Negative except for above in HPI    Objective:     Exam:  /70 (BP Location: Right arm, Patient Position: Sitting, BP Cuff Size: Adult)   Pulse 93   Temp 37 °C (98.6 °F)   Ht 1.727 m (5' 8\")   Wt 76.2 kg (168 lb)   SpO2 96%   BMI 25.54 kg/m²  Body mass index is 25.54 kg/m².    Gen: Alert and oriented, No apparent distress.  HEENT: NCAT, MMM, No lymphadenopathy, nasal mucosa mildly erythematous bilaterally  Lungs: Normal effort, CTA bilaterally, no wheezes, rhonchi, or rales  CV: Regular rate and rhythm. No murmurs, rubs, or gallops. Radial pulses palpable bilat  Abd: Soft, non-distended, no guarding, no rebound, non-tender to palpation  Ext: No clubbing, cyanosis, edema.  Skin: Right mid axillary 0.25 cm lesion, with mildly irregular inferior borders.  Neuro: Non-focal      Assessment & Plan:     39 y.o. female with the following -     Problem List Items Addressed This Visit       Anxiety    Chronic, stable, well-controlled on Zoloft 50 mg daily.  Patient needs medication refill at this time.    Plan:  - Refill Zoloft 50 mg daily         Relevant Medications    sertraline (ZOLOFT) 50 MG Tab    Panic disorder    Chronic, moderate, patient experiences panic attacks up to 3 times per month however, sometimes goes up to 3 months without having a panic attack.  Patient continues to tolerate Klonopin as needed as needed for her panic attacks.    Plan:  - Refill Klonopin 0.5 mg x 30 tablets, no refills  - Continue to treat anxiety         Relevant Medications    sertraline (ZOLOFT) 50 MG Tab    clonazePAM (KLONOPIN) 0.5 MG Tab    Skin lesion    Suspicious mid axillary lesion on the right side, approximately 0.25 cm in diameter, with inferior border beginning to look distorted.  Patient has no prior history of skin cancers.  She would also like to establish with a dermatologist for a head to toe " exam.    Plan:  - Refer to dermatology         Relevant Orders    Referral to Dermatology     Other Visit Diagnoses         Encounter for medical examination to establish care    -  Primary    Relevant Orders    Referral to Dermatology               Return if symptoms worsen or fail to improve.    Tony Umaña M.D.             [1]   Current Outpatient Medications Ordered in Epic   Medication Sig Dispense Refill    sertraline (ZOLOFT) 50 MG Tab Take 1 Tablet by mouth every day. 100 Tablet 3    clonazePAM (KLONOPIN) 0.5 MG Tab Take 1 Tablet by mouth 1 time a day as needed (Panic attacks) for up to 30 days. 30 Tablet 0    omeprazole (PRILOSEC) 40 MG delayed-release capsule Take 1 Capsule by mouth every day. 90 Capsule 0    morphine (MS IR) 15 MG tablet Take 15 mg by mouth 2 times a day.      Semaglutide (WEGOVY) 0.5 MG/0.5ML Solution Auto-injector Pen-injector Inject 0.5 mL under the skin every 7 days. 1 Each 3    Naloxone (NARCAN) 4 MG/0.1ML Liquid One spray in one nostril for overdose and call 911. 1 Each 1    SUMAtriptan (IMITREX) 25 MG Tab tablet TAKE 1 TO 4 TABLETS BY MOUTH ONE TIME AS NEEDED FOR MIGRAINE. 9 Tablet 2    valACYclovir (VALTREX) 500 MG Tab TAKE 1 TABLET BY MOUTH TWICE A DAY 60 Tablet 1    Multiple Vitamin (MULTIVITAMIN ADULT PO) Take 1 Tablet by mouth every day.      celecoxib (CELEBREX) 100 MG Cap        No current Epic-ordered facility-administered medications on file.

## 2025-06-06 ENCOUNTER — APPOINTMENT (OUTPATIENT)
Dept: MEDICAL GROUP | Facility: CLINIC | Age: 40
End: 2025-06-06
Payer: MEDICAID

## 2025-06-06 ENCOUNTER — HOSPITAL ENCOUNTER (EMERGENCY)
Facility: MEDICAL CENTER | Age: 40
End: 2025-06-06
Attending: EMERGENCY MEDICINE
Payer: MEDICAID

## 2025-06-06 VITALS
OXYGEN SATURATION: 98 % | BODY MASS INDEX: 24.21 KG/M2 | DIASTOLIC BLOOD PRESSURE: 78 MMHG | RESPIRATION RATE: 18 BRPM | HEART RATE: 78 BPM | WEIGHT: 169.09 LBS | TEMPERATURE: 97.4 F | HEIGHT: 70 IN | SYSTOLIC BLOOD PRESSURE: 129 MMHG

## 2025-06-06 DIAGNOSIS — R10.13 EPIGASTRIC PAIN: ICD-10-CM

## 2025-06-06 DIAGNOSIS — R11.2 NAUSEA AND VOMITING, UNSPECIFIED VOMITING TYPE: Primary | ICD-10-CM

## 2025-06-06 LAB
ALBUMIN SERPL BCP-MCNC: 4.3 G/DL (ref 3.2–4.9)
ALBUMIN/GLOB SERPL: 1.5 G/DL
ALP SERPL-CCNC: 68 U/L (ref 30–99)
ALT SERPL-CCNC: 8 U/L (ref 2–50)
ANION GAP SERPL CALC-SCNC: 13 MMOL/L (ref 7–16)
AST SERPL-CCNC: 13 U/L (ref 12–45)
BASOPHILS # BLD AUTO: 0.3 % (ref 0–1.8)
BASOPHILS # BLD: 0.02 K/UL (ref 0–0.12)
BILIRUB SERPL-MCNC: 0.4 MG/DL (ref 0.1–1.5)
BUN SERPL-MCNC: 7 MG/DL (ref 8–22)
CALCIUM ALBUM COR SERPL-MCNC: 9.1 MG/DL (ref 8.5–10.5)
CALCIUM SERPL-MCNC: 9.3 MG/DL (ref 8.5–10.5)
CHLORIDE SERPL-SCNC: 103 MMOL/L (ref 96–112)
CO2 SERPL-SCNC: 23 MMOL/L (ref 20–33)
CREAT SERPL-MCNC: 0.8 MG/DL (ref 0.5–1.4)
EOSINOPHIL # BLD AUTO: 0.09 K/UL (ref 0–0.51)
EOSINOPHIL NFR BLD: 1.3 % (ref 0–6.9)
ERYTHROCYTE [DISTWIDTH] IN BLOOD BY AUTOMATED COUNT: 41.4 FL (ref 35.9–50)
GFR SERPLBLD CREATININE-BSD FMLA CKD-EPI: 95 ML/MIN/1.73 M 2
GLOBULIN SER CALC-MCNC: 2.9 G/DL (ref 1.9–3.5)
GLUCOSE SERPL-MCNC: 97 MG/DL (ref 65–99)
HCG SERPL QL: NEGATIVE
HCT VFR BLD AUTO: 41.3 % (ref 37–47)
HGB BLD-MCNC: 13.5 G/DL (ref 12–16)
IMM GRANULOCYTES # BLD AUTO: 0.02 K/UL (ref 0–0.11)
IMM GRANULOCYTES NFR BLD AUTO: 0.3 % (ref 0–0.9)
LIPASE SERPL-CCNC: 38 U/L (ref 11–82)
LYMPHOCYTES # BLD AUTO: 1.62 K/UL (ref 1–4.8)
LYMPHOCYTES NFR BLD: 22.7 % (ref 22–41)
MCH RBC QN AUTO: 30 PG (ref 27–33)
MCHC RBC AUTO-ENTMCNC: 32.7 G/DL (ref 32.2–35.5)
MCV RBC AUTO: 91.8 FL (ref 81.4–97.8)
MONOCYTES # BLD AUTO: 0.48 K/UL (ref 0–0.85)
MONOCYTES NFR BLD AUTO: 6.7 % (ref 0–13.4)
NEUTROPHILS # BLD AUTO: 4.92 K/UL (ref 1.82–7.42)
NEUTROPHILS NFR BLD: 68.7 % (ref 44–72)
NRBC # BLD AUTO: 0 K/UL
NRBC BLD-RTO: 0 /100 WBC (ref 0–0.2)
PLATELET # BLD AUTO: 282 K/UL (ref 164–446)
PMV BLD AUTO: 10.2 FL (ref 9–12.9)
POTASSIUM SERPL-SCNC: 3.8 MMOL/L (ref 3.6–5.5)
PROT SERPL-MCNC: 7.2 G/DL (ref 6–8.2)
RBC # BLD AUTO: 4.5 M/UL (ref 4.2–5.4)
SODIUM SERPL-SCNC: 139 MMOL/L (ref 135–145)
WBC # BLD AUTO: 7.2 K/UL (ref 4.8–10.8)

## 2025-06-06 PROCEDURE — 36415 COLL VENOUS BLD VENIPUNCTURE: CPT

## 2025-06-06 PROCEDURE — 83690 ASSAY OF LIPASE: CPT

## 2025-06-06 PROCEDURE — 700102 HCHG RX REV CODE 250 W/ 637 OVERRIDE(OP): Performed by: EMERGENCY MEDICINE

## 2025-06-06 PROCEDURE — A9270 NON-COVERED ITEM OR SERVICE: HCPCS | Performed by: EMERGENCY MEDICINE

## 2025-06-06 PROCEDURE — 96374 THER/PROPH/DIAG INJ IV PUSH: CPT

## 2025-06-06 PROCEDURE — 99285 EMERGENCY DEPT VISIT HI MDM: CPT

## 2025-06-06 PROCEDURE — 700105 HCHG RX REV CODE 258: Performed by: EMERGENCY MEDICINE

## 2025-06-06 PROCEDURE — 84703 CHORIONIC GONADOTROPIN ASSAY: CPT

## 2025-06-06 PROCEDURE — 80053 COMPREHEN METABOLIC PANEL: CPT

## 2025-06-06 PROCEDURE — 85025 COMPLETE CBC W/AUTO DIFF WBC: CPT

## 2025-06-06 PROCEDURE — 700111 HCHG RX REV CODE 636 W/ 250 OVERRIDE (IP): Mod: JZ | Performed by: EMERGENCY MEDICINE

## 2025-06-06 PROCEDURE — 96361 HYDRATE IV INFUSION ADD-ON: CPT

## 2025-06-06 RX ORDER — HALOPERIDOL 5 MG/ML
2.5 INJECTION INTRAMUSCULAR ONCE
Status: COMPLETED | OUTPATIENT
Start: 2025-06-06 | End: 2025-06-06

## 2025-06-06 RX ORDER — LORAZEPAM 1 MG/1
1 TABLET ORAL ONCE
Status: COMPLETED | OUTPATIENT
Start: 2025-06-06 | End: 2025-06-06

## 2025-06-06 RX ORDER — SODIUM CHLORIDE 9 MG/ML
1000 INJECTION, SOLUTION INTRAVENOUS ONCE
Status: COMPLETED | OUTPATIENT
Start: 2025-06-06 | End: 2025-06-06

## 2025-06-06 RX ORDER — ALUMINA, MAGNESIA, AND SIMETHICONE 2400; 2400; 240 MG/30ML; MG/30ML; MG/30ML
10 SUSPENSION ORAL 4 TIMES DAILY PRN
Qty: 560 ML | Refills: 0 | Status: SHIPPED | OUTPATIENT
Start: 2025-06-06

## 2025-06-06 RX ORDER — PROCHLORPERAZINE 25 MG
25 SUPPOSITORY, RECTAL RECTAL EVERY 6 HOURS PRN
Qty: 10 SUPPOSITORY | Refills: 1 | Status: SHIPPED | OUTPATIENT
Start: 2025-06-06

## 2025-06-06 RX ADMIN — LIDOCAINE HYDROCHLORIDE 30 ML: 20 SOLUTION ORAL; TOPICAL at 10:00

## 2025-06-06 RX ADMIN — LORAZEPAM 1 MG: 1 TABLET ORAL at 09:48

## 2025-06-06 RX ADMIN — SODIUM CHLORIDE 1000 ML: 9 INJECTION, SOLUTION INTRAVENOUS at 10:08

## 2025-06-06 RX ADMIN — HALOPERIDOL LACTATE 2.5 MG: 5 INJECTION, SOLUTION INTRAMUSCULAR at 10:08

## 2025-06-06 ASSESSMENT — FIBROSIS 4 INDEX: FIB4 SCORE: 1.01

## 2025-06-06 NOTE — ED PROVIDER NOTES
ED Provider Note    Primary care provider: Tony Umaña M.D.  Means of arrival: private vehicle  History obtained from: patient  History limited by: none    CHIEF COMPLAINT  Chief Complaint   Patient presents with    N/V     Strong Hx of cannabinoid hyperemesis and had a recent zamorano with the marijuana. N/V and abdominal discomfort ensued.        HPI/ROS  Magui Mallory is a 39 y.o. female who presents to the Emergency Department for evaluation of nausea and vomiting.  Patient reports that she has a history of cannabinoid hyperemesis.  She had been clean for 2 years but then smoked marijuana again a few nights ago.  She was trying to manage her symptoms of nausea and vomiting at home with Reglan she had, however this has been unsuccessful and therefore she came in for further evaluation.  She reports poor appetite over the last few days with associated nausea and vomiting.  She reports epigastric abdominal discomfort.  Prior abdominal surgical history notable for tubal ligation.  Denies any fevers, chills, chest pain or shortness of breath.    EXTERNAL RECORDS REVIEWED  Patient was seen by primary care physician yesterday for follow-up.  She has a history of panic attacks and was refilled on her Klonopin.  She is also on sertraline.      LIMITATION TO HISTORY   none    OUTSIDE HISTORIAN(S):  none      PAST MEDICAL HISTORY   has a past medical history of Arthritis, Herpes, Pain, and Psychiatric problem.    SURGICAL HISTORY   has a past surgical history that includes meniscus repair (2015); lap,diagnostic abdomen (03/03/2023); and other orthopedic surgery (Bilateral).    SOCIAL HISTORY  Social History[1]   Social History     Substance and Sexual Activity   Drug Use Yes    Types: Inhaled    Comment: the marijuana       FAMILY HISTORY  History reviewed. No pertinent family history.    CURRENT MEDICATIONS  Home Medications       Reviewed by Tay Ye R.N. (Registered Nurse) on 06/06/25 at 0846  Med  "List Status: Not Addressed     Medication Last Dose Status   celecoxib (CELEBREX) 100 MG Cap  Active   clonazePAM (KLONOPIN) 0.5 MG Tab  Active   morphine (MS IR) 15 MG tablet  Active   Multiple Vitamin (MULTIVITAMIN ADULT PO)  Active   Naloxone (NARCAN) 4 MG/0.1ML Liquid  Active   omeprazole (PRILOSEC) 40 MG delayed-release capsule  Active   Semaglutide (WEGOVY) 0.5 MG/0.5ML Solution Auto-injector Pen-injector  Active   sertraline (ZOLOFT) 50 MG Tab  Active   SUMAtriptan (IMITREX) 25 MG Tab tablet  Active   valACYclovir (VALTREX) 500 MG Tab  Active                    ALLERGIES  Allergies[2]    PHYSICAL EXAM  VITAL SIGNS: /88   Pulse 84   Temp 36.3 °C (97.4 °F) (Temporal)   Resp 18   Ht 1.778 m (5' 10\")   Wt 76.7 kg (169 lb 1.5 oz)   LMP 06/04/2025 (Exact Date)   SpO2 98%   BMI 24.26 kg/m²   Vitals reviewed by myself.  Physical Exam  Nursing note and vitals reviewed.  Constitutional: Well-developed and well-nourished. No acute distress.   HENT: Head is normocephalic and atraumatic.  Eyes: extra-ocular movements intact  Cardiovascular: regular rate and  regular rhythm. No murmur heard.  Pulmonary/Chest: Breath sounds normal. No wheezes or rales.   Abdominal: Soft with mild epigastric discomfort, no rebound or guarding, negative Red sign, negative McBurney's point tenderness  Musculoskeletal: Extremities exhibit normal range of motion without edema or tenderness.   Neurological: Awake and alert  Skin: Skin is warm and dry. No rash.         DIAGNOSTIC STUDIES:  LABS  Labs Reviewed   COMP METABOLIC PANEL - Abnormal; Notable for the following components:       Result Value    Bun 7 (*)     All other components within normal limits   CBC WITH DIFFERENTIAL   LIPASE   HCG QUAL SERUM   ESTIMATED GFR   URINALYSIS,CULTURE IF INDICATED       All labs reviewed and independently interpreted by myself        COURSE & MEDICAL DECISION MAKING    Hydration: Based on the patient's presentation of Acute Vomiting the " patient was given IV fluids. IV Hydration was used because oral hydration was not adequate alone. Upon recheck following hydration, the patient was improved.    INITIAL ASSESSMENT, ED COURSE AND PLAN    Patient is a 39-year-old female who presents for evaluation of nausea and vomiting and epigastric discomfort.  Differential diagnosis includes cannabinoid hyperemesis, electrolyte derangement, dehydration, gastritis, pancreatitis.  Diagnostic workup includes labs.  Abdominal exam is overall relatively benign with mild epigastric tenderness without rebound or guarding.  Low suspicion for acute surgical pathology and therefore I do not believe imaging is indicated.  Will obtain labs for further evaluation.    Patient's initial vitals are within normal limits.  In the past patient reports she has responded well to Ativan and Haldol.  Therefore she is treated with Ativan, Haldol, GI cocktail and IV fluids.  Patient's labs returned are unremarkable.  Lipase is within normal limits ruling out pancreatitis.  LFTs are within normal limits and she has no right upper quadrant tenderness on exam making biliary pathology unlikely.  Patient is not pregnant.  Upon reassessment she is feeling improved and tolerating oral intake.  Therefore she is reassured and advised on management of symptoms at home.  She notes in the past she has had success with Compazine suppositories therefore I will prescribe these for home.  I will also prescribe Maalox.  She is then given strict return precautions and discharged in stable condition.       DISPOSITION AND DISCUSSIONS  I have discussed management of the patient with the following physicians and ALEIDA's:  none    Discussion of management with other QHP or appropriate source(s): none     Escalation of care considered, and ultimately not performed:see above    Barriers to care at this time, including but not limited to: none.     Decision tools and prescription drugs considered including, but not  limited to: see above.        FINAL IMPRESSION  1. Nausea and vomiting, unspecified vomiting type    2. Epigastric pain                    [1]   Social History  Tobacco Use    Smoking status: Former     Current packs/day: 0.00     Average packs/day: 0.3 packs/day for 23.0 years (5.8 ttl pk-yrs)     Types: Cigarettes     Start date: 1/1/1999     Quit date: 1/1/2022     Years since quitting: 3.4    Smokeless tobacco: Never   Vaping Use    Vaping status: Every Day    Substances: Nicotine   Substance Use Topics    Alcohol use: Yes     Comment: 3/month    Drug use: Yes     Types: Inhaled     Comment: the marijuana   [2]   Allergies  Allergen Reactions    Codeine Hives and Itching

## 2025-06-06 NOTE — ED TRIAGE NOTES
"Chief Complaint   Patient presents with    N/V     Strong Hx of cannabinoid hyperemesis and had a recent zamorano with the marijuana. N/V and abdominal discomfort ensued.      Blood Pressure: 131/88, Pulse: 84, Respiration: 18, Temperature: 36.3 °C (97.4 °F), Height: 177.8 cm (5' 10\"), Weight: 76.7 kg (169 lb 1.5 oz), BMI (Calculated): 24.26, BSA (Calculated): 1.9, Pulse Oximetry: 98 %, O2 Delivery Device: None - Room Air  "

## 2025-06-16 NOTE — Clinical Note
REFERRAL APPROVAL NOTICE         Sent on June 16, 2025                   Magui Mallory  [address]  [place] [address]                   Dear Ms. Mallory,    After a careful review of the medical information and benefit coverage, [facility] has processed your referral. See below for additional details.    If applicable, you must be actively enrolled with your insurance for coverage of the authorized service. If you have any questions regarding your coverage, please contact your insurance directly.    REFERRAL INFORMATION   Referral #:  [id number]  Referred-To Provider    Referred-By Provider:  Dermatology    Tony Umaña M.D.   [facility]      [address]  [place] [address]  [phone] [address]  [place] [address]  [phone]    Referral Start Date:  06/05/2025  Referral End Date:   06/05/2026             SCHEDULING  If you do not already have an appointment, please call [phone] to make an appointment.     MORE INFORMATION  If you do not already have a [facility] account, sign up at: [email or web address]  You can access your medical information, make appointments, see lab results, billing information, and more.  If you have questions regarding this referral, please contact  the [facility] Referrals department at:             [phone]. Monday - Friday 8:00AM - 5:00PM.     Sincerely,    [facility]

## (undated) DEVICE — SUCTION INSTRUMENT YANKAUER BULBOUS TIP W/O VENT (50EA/CA)

## (undated) DEVICE — SUTURE GENERAL

## (undated) DEVICE — PAD SANITARY 11IN MAXI IND WRAPPED  (12EA/PK 24PK/CA)

## (undated) DEVICE — TOWEL STOP TIMEOUT SAFETY FLAG (40EA/CA)

## (undated) DEVICE — CANNULA W/SEAL 5X100 Z-THRE - ADED KII (12/BX)

## (undated) DEVICE — GOWN WARMING STANDARD FLEX - (30/CA)

## (undated) DEVICE — TROCAR 5X100 NON BLADED Z-TH - READ KII (6/BX)

## (undated) DEVICE — SUTURE 4-0 MONOCRYL PLUS PS-2 - 27 INCH (36/BX)

## (undated) DEVICE — TRAY FOLEY CATHETER STATLOCK 16FR SURESTEP  (10EA/CA)

## (undated) DEVICE — TUBING CLEARLINK DUO-VENT - C-FLO (48EA/CA)

## (undated) DEVICE — LIGASURE LAPAROSCOPIC 5MM - (6EA/CA)

## (undated) DEVICE — DERMABOND ADVANCED - (12EA/BX)

## (undated) DEVICE — CANISTER SUCTION 3000ML MECHANICAL FILTER AUTO SHUTOFF MEDI-VAC NONSTERILE LF DISP  (40EA/CA)

## (undated) DEVICE — SENSOR OXIMETER ADULT SPO2 RD SET (20EA/BX)

## (undated) DEVICE — CANISTER SUCTION RIGID RED 1500CC (40EA/CA)

## (undated) DEVICE — MASK OXYGEN VNYL ADLT MED CONC WITH 7 FOOT TUBING  - (50EA/CA)

## (undated) DEVICE — GLOVE SZ 7 BIOGEL PI MICRO - PF LF (50PR/BX 4BX/CA)

## (undated) DEVICE — SET LEADWIRE 5 LEAD BEDSIDE DISPOSABLE ECG (1SET OF 5/EA)

## (undated) DEVICE — SLEEVE VASO CALF MED - (10PR/CA)

## (undated) DEVICE — SUTURE 0 VICRYL PLUS CT-2 - 27 INCH (36/BX)

## (undated) DEVICE — LACTATED RINGERS INJ 1000 ML - (14EA/CA 60CA/PF)

## (undated) DEVICE — Device

## (undated) DEVICE — CANNULA W/ SUPPLY TUBING O2 - (50/CA)

## (undated) DEVICE — WATER IRRIGATION STERILE 1000ML (12EA/CA)

## (undated) DEVICE — SODIUM CHL IRRIGATION 0.9% 1000ML (12EA/CA)

## (undated) DEVICE — TROCAR Z THREAD 11 X 100 - BLADED (6/BX)

## (undated) DEVICE — GLOVE BIOGEL SZ 6.5 SURGICAL PF LTX (50PR/BX 4BX/CA)

## (undated) DEVICE — TUBE CONNECTING SUCTION - CLEAR PLASTIC STERILE 72 IN (50EA/CA)

## (undated) DEVICE — KIT  I.V. START (100EA/CA)

## (undated) DEVICE — CLOSURE SKIN STRIP 1/2 X 4 IN - (STERI STRIP) (50/BX 4BX/CA)